# Patient Record
Sex: MALE | Race: WHITE | NOT HISPANIC OR LATINO | Employment: FULL TIME | URBAN - NONMETROPOLITAN AREA
[De-identification: names, ages, dates, MRNs, and addresses within clinical notes are randomized per-mention and may not be internally consistent; named-entity substitution may affect disease eponyms.]

---

## 2017-02-14 ENCOUNTER — TRANSFERRED RECORDS (OUTPATIENT)
Dept: HEALTH INFORMATION MANAGEMENT | Facility: HOSPITAL | Age: 42
End: 2017-02-14

## 2017-07-24 ENCOUNTER — OFFICE VISIT (OUTPATIENT)
Dept: FAMILY MEDICINE | Facility: OTHER | Age: 42
End: 2017-07-24
Attending: FAMILY MEDICINE
Payer: COMMERCIAL

## 2017-07-24 VITALS
OXYGEN SATURATION: 97 % | WEIGHT: 305 LBS | SYSTOLIC BLOOD PRESSURE: 124 MMHG | TEMPERATURE: 98.5 F | BODY MASS INDEX: 39.16 KG/M2 | HEART RATE: 76 BPM | DIASTOLIC BLOOD PRESSURE: 88 MMHG

## 2017-07-24 DIAGNOSIS — M54.42 ACUTE BILATERAL LOW BACK PAIN WITH LEFT-SIDED SCIATICA: Primary | ICD-10-CM

## 2017-07-24 PROCEDURE — 99213 OFFICE O/P EST LOW 20 MIN: CPT | Performed by: FAMILY MEDICINE

## 2017-07-24 ASSESSMENT — PATIENT HEALTH QUESTIONNAIRE - PHQ9: 5. POOR APPETITE OR OVEREATING: NOT AT ALL

## 2017-07-24 ASSESSMENT — PAIN SCALES - GENERAL: PAINLEVEL: NO PAIN (0)

## 2017-07-24 ASSESSMENT — ANXIETY QUESTIONNAIRES
GAD7 TOTAL SCORE: 0
6. BECOMING EASILY ANNOYED OR IRRITABLE: NOT AT ALL
1. FEELING NERVOUS, ANXIOUS, OR ON EDGE: NOT AT ALL
3. WORRYING TOO MUCH ABOUT DIFFERENT THINGS: NOT AT ALL
5. BEING SO RESTLESS THAT IT IS HARD TO SIT STILL: NOT AT ALL
2. NOT BEING ABLE TO STOP OR CONTROL WORRYING: NOT AT ALL
7. FEELING AFRAID AS IF SOMETHING AWFUL MIGHT HAPPEN: NOT AT ALL
IF YOU CHECKED OFF ANY PROBLEMS ON THIS QUESTIONNAIRE, HOW DIFFICULT HAVE THESE PROBLEMS MADE IT FOR YOU TO DO YOUR WORK, TAKE CARE OF THINGS AT HOME, OR GET ALONG WITH OTHER PEOPLE: NOT DIFFICULT AT ALL

## 2017-07-24 NOTE — MR AVS SNAPSHOT
"              After Visit Summary   7/24/2017    Pan Marroquin    MRN: [id number]           Patient Information     Date Of Birth          1975        Visit Information        Provider Department      7/24/2017 9:20 AM Jeronimo Hall MD [facility]        Today's Diagnoses     Acute bilateral low back pain with left-sided sciatica    -  1      Care Instructions    Take medrol dosepak as directed          Follow-ups after your visit        Additional Services     PHYSICAL THERAPY REFERRAL       *This therapy referral will be filtered to a centralized scheduling office at [facility] and the patient will receive a call to schedule an appointment at a [place] location most convenient for them. *     [facility] provides Physical Therapy evaluation and treatment and many specialty services across the [place] system.  If requesting a specialty program, please choose from the list below.    If you have not heard from the scheduling office within 2 business days, please call [phone] for all locations, with the exception of [place], please call [phone].  Treatment: Evaluation & Treatment  Special Instructions/Modalities: None  Special Programs: None    Please be aware that coverage of these services is subject to the terms and limitations of your health insurance plan.  Call member services at your health plan with any benefit or coverage questions.      **Note to Provider:  If you are referring outside of [place] for the therapy appointment, please list the name of the location in the \"special instructions\" above, print the referral and give to the patient to schedule the appointment.                  Follow-up notes from your care team     Return in about 1 month (around 8/24/2017) for Occupational follow up visit.      Who to contact     If you have questions or need follow up information about today's clinic visit or your schedule please contact " "[facility] [place] directly at [phone].  Normal or non-critical lab and imaging results will be communicated to you by MyChart, letter or phone within 4 business days after the clinic has received the results. If you do not hear from us within 7 days, please contact the clinic through [employer] or phone. If you have a critical or abnormal lab result, we will notify you by phone as soon as possible.  Submit refill requests through [employer] or call your pharmacy and they will forward the refill request to us. Please allow 3 business days for your refill to be completed.          Additional Information About Your Visit        [email or web address] Information     [employer] lets you send messages to your doctor, view your test results, renew your prescriptions, schedule appointments and more. To sign up, go to [email or web address] . Click on \"Log in\" on the left side of the screen, which will take you to the Welcome page. Then click on \"Sign up Now\" on the right side of the page.     You will be asked to enter the access code listed below, as well as some personal information. Please follow the directions to create your username and password.     Your access code is: [email or web address]  Expires: 10/25/2017 12:10 PM     Your access code will  in 90 days. If you need help or a new code, please call your [facility] or [phone].        Care EveryWhere ID     This is your Care EveryWhere ID. This could be used by other organizations to access your [facility] medical records  [id number]        Your Vitals Were     Pulse Temperature Pulse Oximetry BMI (Body Mass Index)          76 98.5  F (36.9  C) (Tympanic) 97% 39.16 kg/m2         Blood Pressure from Last 3 Encounters:   17 124/88   16 140/84   16 124/80    Weight from Last 3 Encounters:   17 (!) 305 lb (138.3 kg)   16 (!) 305 lb (138.3 kg)   16 (!) 313 lb (142 kg)              We Performed the Following     PHYSICAL THERAPY REFERRAL  "         Today's Medication Changes          These changes are accurate as of: 7/24/17 11:59 PM.  If you have any questions, ask your nurse or doctor.               Start taking these medicines.        Dose/Directions    methylPREDNISolone 4 MG tablet   Commonly known as:  MEDROL DOSEPAK   Used for:  Acute bilateral low back pain with left-sided sciatica   Started by:  Jeronimo Hall MD        Follow package instructions   Quantity:  21 tablet   Refills:  0            Where to get your medicines      These medications were sent to [facility] [place] - [address]  [address] [place] [address]     Phone:  [phone]     methylPREDNISolone 4 MG tablet                Primary Care Provider Office Phone # Fax #    Jeronimo Hall [phone]       [facility] [address]  [place] [address]        Equal Access to Services     CORDELIA CHAN : Hadii anil loredo hadasho Soomaali, waaxda luqadaha, qaybta kaalmada adeegyada, crista stoll . So [facility] [phone].    ATENCIÓN: Si habla español, tiene a golden disposición servicios gratuitos de asistencia lingüística. [facility] [phone].    We comply with applicable federal civil rights laws and [place] laws. We do not discriminate on the basis of race, color, national origin, age, disability sex, sexual orientation or gender identity.            Thank you!     Thank you for choosing [facility]  for your care. Our goal is always to provide you with excellent care. Hearing back from our patients is one way we can continue to improve our services. Please take a few minutes to complete the written survey that you may receive in the mail after your visit with us. Thank you!             Your Updated Medication List - Protect others around you: Learn how to safely use, store and throw away your medicines at www.disposemymeds.org.          This list is accurate as of: 7/24/17 11:59 PM.   Always use your most recent med list.                   Brand Name Dispense Instructions for use Diagnosis    methylPREDNISolone 4 MG tablet    MEDROL DOSEPAK    21 tablet    Follow package instructions    Acute bilateral low back pain with left-sided sciatica       mometasone 0.1 % cream    ELOCON    45 g    Apply sparingly to affected area twice daily as needed.  Do not apply to face.    Intrinsic eczema       nexIUM 40 MG CR capsule   Generic drug:  esomeprazole     90 capsule    Take 1 capsule (40 mg) by mouth daily Take 30-60 minutes before eating.    Gastroesophageal reflux disease with esophagitis

## 2017-07-24 NOTE — NURSING NOTE
"Chief Complaint   Patient presents with     Back Pain     Sciatica       Initial /88 (BP Location: Left arm, Patient Position: Chair, Cuff Size: Adult Large)  Pulse 76  Temp 98.5  F (36.9  C) (Tympanic)  Wt (!) 305 lb (138.3 kg)  SpO2 97%  BMI 39.16 kg/m2 Estimated body mass index is 39.16 kg/(m^2) as calculated from the following:    Height as of 12/12/16: 6' 2\" (1.88 m).    Weight as of this encounter: 305 lb (138.3 kg).  Medication Reconciliation: complete   Ariadne Gusman    "

## 2017-07-25 ASSESSMENT — PATIENT HEALTH QUESTIONNAIRE - PHQ9: SUM OF ALL RESPONSES TO PHQ QUESTIONS 1-9: 0

## 2017-07-25 ASSESSMENT — ANXIETY QUESTIONNAIRES: GAD7 TOTAL SCORE: 0

## 2017-07-27 RX ORDER — METHYLPREDNISOLONE 4 MG
TABLET, DOSE PACK ORAL
Qty: 21 TABLET | Refills: 0 | Status: SHIPPED | OUTPATIENT
Start: 2017-07-27 | End: 2019-01-07

## 2017-07-27 NOTE — PROGRESS NOTES
OCCUPATIONAL VISIT    SUBJECTIVE:  Pan Marroquin, 41 year old, male is seen with left low back and leg pain.  This occurred while getting into a truck at [employer].  The date of injury is 11/9/2014.  The patient is employed at [employer]. His symptoms have recurred.    Denies bowel dysfunction, bladder dysfunction, saddle anesthesia, nocturnal symptoms, focal weakness,  or trauma.         No Known Allergies      Review Of Systems  Constitutional, HEENT, cardiovascular, pulmonary, gi and gu systems are negative, except as otherwise noted.    OBJECTIVE:  Vitals: B/P: 150/96, T: Data Unavailable, P: 64, R: 16    Exam:  GENERAL:: healthy, alert and no distress  BACK: no CVA tenderness, left paralumbar tenderness. Reflexes preserved  PSYCH: Alert and oriented times 3; speech- coherent , normal rate and volume; able to articulate logical thoughts, able to abstract reason, no tangential thoughts, no hallucinations or delusions, affect- normal  Other exam not repeated    Labs:  None      ASSESSMENT/PLAN:  Left sided sciatica  Reviewed.  Medrol dosepak as written.  Recommend Physical Therapy.  Follow up 1 month.            Jeronimo Hall MD

## 2017-10-02 DIAGNOSIS — L20.84 INTRINSIC ECZEMA: ICD-10-CM

## 2017-10-03 RX ORDER — MOMETASONE FUROATE 1 MG/G
CREAM TOPICAL
Qty: 45 G | Refills: 1 | Status: SHIPPED | OUTPATIENT
Start: 2017-10-03 | End: 2019-01-07

## 2017-10-03 NOTE — TELEPHONE ENCOUNTER
mometasone (ELOCON) 0.1 % cream      Last Written Prescription Date: 6/6/16  Last Fill Quantity: 45g,  # refills: 0   Last Office Visit with [facility], [facility] or [facility] prescribing provider: 7/24/17

## 2018-06-05 DIAGNOSIS — K21.00 GASTROESOPHAGEAL REFLUX DISEASE WITH ESOPHAGITIS: ICD-10-CM

## 2018-06-06 RX ORDER — ESOMEPRAZOLE MAGNESIUM 40 MG
CAPSULE,DELAYED RELEASE (ENTERIC COATED) ORAL
Qty: 90 CAPSULE | Refills: 0 | Status: SHIPPED | OUTPATIENT
Start: 2018-06-06 | End: 2018-09-02

## 2018-06-06 NOTE — TELEPHONE ENCOUNTER
Nexium  Last Office Visit: 7/24/17  Last Refill Date:12/8/17  # 90          Refills  1      Thank you!

## 2018-12-04 DIAGNOSIS — K21.00 GASTROESOPHAGEAL REFLUX DISEASE WITH ESOPHAGITIS: ICD-10-CM

## 2018-12-04 NOTE — LETTER
December 6, 2018      Pan Marroquin  [address] [place] [address]        Dear Pan,     APPOINTMENT REMINDER:   Our records indicates that it is time for you to be seen for a follow up appointment.      Your current medication request for NEXIUM 40 MG CR capsule will be approved for one refill but you will need to be seen before any additional refills can be approved.  Taking care of your health is important to us, and ongoing visits with your provider are vital to your care.    We look forward to seeing you in the near future.  You may call our office at [phone] to schedule a visit.     Please disregard this notice if you have already made an appointment.        Sincerely,    Jeronimo Hall MD

## 2018-12-05 NOTE — TELEPHONE ENCOUNTER
NEXIUM 40 MG DR capsule    Last Written Prescription Date:  9/5/18  Last Fill Quantity: 90,   # refills: 0  Last Office Visit: 7/24/17  Future Office visit:

## 2018-12-06 RX ORDER — ESOMEPRAZOLE MAGNESIUM 40 MG
CAPSULE,DELAYED RELEASE (ENTERIC COATED) ORAL
Qty: 30 CAPSULE | Refills: 0 | Status: SHIPPED | OUTPATIENT
Start: 2018-12-06 | End: 2019-01-07

## 2019-01-07 ENCOUNTER — OFFICE VISIT (OUTPATIENT)
Dept: FAMILY MEDICINE | Facility: OTHER | Age: 44
End: 2019-01-07
Attending: FAMILY MEDICINE
Payer: COMMERCIAL

## 2019-01-07 VITALS
BODY MASS INDEX: 39.16 KG/M2 | DIASTOLIC BLOOD PRESSURE: 106 MMHG | TEMPERATURE: 98 F | RESPIRATION RATE: 20 BRPM | SYSTOLIC BLOOD PRESSURE: 140 MMHG | OXYGEN SATURATION: 92 % | HEART RATE: 80 BPM | HEIGHT: 74 IN

## 2019-01-07 DIAGNOSIS — R03.0 ELEVATED BLOOD PRESSURE READING WITHOUT DIAGNOSIS OF HYPERTENSION: ICD-10-CM

## 2019-01-07 DIAGNOSIS — K21.00 GASTROESOPHAGEAL REFLUX DISEASE WITH ESOPHAGITIS: ICD-10-CM

## 2019-01-07 DIAGNOSIS — L20.84 INTRINSIC ECZEMA: ICD-10-CM

## 2019-01-07 DIAGNOSIS — K21.00 GASTROESOPHAGEAL REFLUX DISEASE WITH ESOPHAGITIS: Primary | ICD-10-CM

## 2019-01-07 PROBLEM — E66.01 MORBID OBESITY (H): Status: ACTIVE | Noted: 2019-01-07

## 2019-01-07 PROCEDURE — 99213 OFFICE O/P EST LOW 20 MIN: CPT | Performed by: FAMILY MEDICINE

## 2019-01-07 RX ORDER — ESOMEPRAZOLE MAGNESIUM 40 MG
40 CAPSULE,DELAYED RELEASE (ENTERIC COATED) ORAL
Qty: 90 CAPSULE | Refills: 1 | Status: SHIPPED | OUTPATIENT
Start: 2019-01-07 | End: 2019-05-10

## 2019-01-07 RX ORDER — MOMETASONE FUROATE 1 MG/G
CREAM TOPICAL
Qty: 45 G | Refills: 1 | Status: SHIPPED | OUTPATIENT
Start: 2019-01-07 | End: 2021-04-21

## 2019-01-07 ASSESSMENT — PATIENT HEALTH QUESTIONNAIRE - PHQ9: SUM OF ALL RESPONSES TO PHQ QUESTIONS 1-9: 2

## 2019-01-07 ASSESSMENT — PAIN SCALES - GENERAL: PAINLEVEL: NO PAIN (0)

## 2019-01-07 NOTE — NURSING NOTE
"Chief Complaint   Patient presents with     Gastrophageal Reflux       Initial BP (!) 140/108 (BP Location: Left arm, Patient Position: Sitting, Cuff Size: Adult Large)   Pulse 80   Temp 98  F (36.7  C) (Tympanic)   Resp 20   Ht 1.88 m (6' 2\")   SpO2 92%   BMI 39.16 kg/m   Estimated body mass index is 39.16 kg/m  as calculated from the following:    Height as of this encounter: 1.88 m (6' 2\").    Weight as of 7/24/17: 138.3 kg (305 lb).  Medication Reconciliation: complete    Sonam Henriquez LPN  "

## 2019-01-07 NOTE — PROGRESS NOTES
SUBJECTIVE:   Pan Marroquin is a 43 year old male who presents to clinic today for the following health issues:    Gastrointestinal symptoms      Duration: prior     Description:           REFLUX SYMPTOMS - heartburn and acid taste in mouth      Intensity:  mild    Accompanying signs and symptoms:  none    History  Previous {similar problem: YES  Previous evaluation:  EGD    Aggravating factors: none, fatty meals, alcohol and spicy foods    Alleviating factors: nexium    Other Therapies tried: None         Amount of exercise or physical activity: 2-3 days/week for an average of 30-45 minutes    Problems taking medications regularly: No    Medication side effects: none    Diet: regular (no restrictions)        Elevated blood pressure      Duration: Recent    Description (location/character/radiation): NA    Intensity:  mild    Accompanying signs and symptoms: None    History (similar episodes/previous evaluation): None    Precipitating or alleviating factors: anxiety    Therapies tried and outcome: None      Pan has had mildly elevated blood pressures.  This has been check at his place of employment and has been normal to mildly elevated diastolic.  He has a family history of hypertension.    Problem list and histories reviewed & adjusted, as indicated.  Additional history: as documented    Patient Active Problem List   Diagnosis     GERD (gastroesophageal reflux disease)     Obesity     RUSSELL (obstructive sleep apnea)     ACP (advance care planning)     Eczema     Obesity (BMI 35.0-39.9) with comorbidity (H)     Past Surgical History:   Procedure Laterality Date     EGD  2005     shoulder  2007    left       Social History     Tobacco Use     Smoking status: Never Smoker     Smokeless tobacco: Never Used   Substance Use Topics     Alcohol use: Yes     Comment: rarely     Family History   Problem Relation Age of Onset     Cancer Unknown         unknown; family h/o     Diabetes Unknown         family h/o     Other -  "See Comments Father         GERD     Thyroid Disease No family hx of      Asthma No family hx of          Current Outpatient Medications   Medication Sig Dispense Refill     mometasone (ELOCON) 0.1 % cream APPLY SPARINGLY TO AFFECTED AREA TWICE A DAY AS NEEDED. DO NOT APPLY TO FACE 45 g 1     NEXIUM 40 MG DR capsule TAKE 1 CAPSULE DAILY 30 TO 60 MINUTES BEFORE EATING (DUE FOR OFFICE VISIT) 30 capsule 0     No Known Allergies  BP Readings from Last 3 Encounters:   01/07/19 (!) 140/106   07/24/17 124/88   12/12/16 140/84    Wt Readings from Last 3 Encounters:   07/24/17 (!) 138.3 kg (305 lb)   12/12/16 (!) 138.3 kg (305 lb)   06/06/16 (!) 142 kg (313 lb)                    Reviewed and updated as needed this visit by clinical staff  Tobacco  Allergies  Meds  Problems  Med Hx  Surg Hx  Fam Hx       Reviewed and updated as needed this visit by Provider         ROS:  Constitutional, HEENT, cardiovascular, pulmonary, gi and gu systems are negative, except as otherwise noted.    OBJECTIVE:     BP (!) 140/106 (BP Location: Left arm, Patient Position: Sitting, Cuff Size: Adult Large)   Pulse 80   Temp 98  F (36.7  C) (Tympanic)   Resp 20   Ht 1.88 m (6' 2\")   SpO2 92%   BMI 39.16 kg/m    Body mass index is 39.16 kg/m .  Physical Exam   Constitutional: He is oriented to person, place, and time. He appears well-developed and well-nourished. No distress.   Neurological: He is alert and oriented to person, place, and time.   Psychiatric: He has a normal mood and affect.       Other exam not repeated.  Diagnostic Test Results:  none     ASSESSMENT/PLAN:     GERD (gastroesophageal reflux disease)  He has had previous EGD.  Continue Nexium as written.  - NEXIUM 40 MG DR capsule; Take 1 capsule (40 mg) by mouth every morning (before breakfast) Take 30-60 minutes before eating.    Elevated blood pressure reading without diagnosis of hypertension  Instructed in home blood pressure monitoring.  If remains elevated, will " place on angiotensin coverting enzyme inhibitor     Eczema  Refilled  - mometasone (ELOCON) 0.1 % external cream; APPLY SPARINGLY TO AFFECTED AREA TWICE A DAY AS NEEDED. DO NOT APPLY TO FACE            Jeronimo Hall MD  [facility] [place]

## 2019-05-09 DIAGNOSIS — K21.00 GASTROESOPHAGEAL REFLUX DISEASE WITH ESOPHAGITIS: ICD-10-CM

## 2019-05-09 NOTE — TELEPHONE ENCOUNTER
Nexium 40 MG DR Capsule   Last Written Prescription Date:1/7/2018  Last Fill Quantity: 90,   # refills: 1  Last Office Visit: 1/7/2019  Future Office visit:       Routing refill request to provider for review/approval because:    [facility].

## 2019-05-10 RX ORDER — ESOMEPRAZOLE MAGNESIUM 40 MG
40 CAPSULE,DELAYED RELEASE (ENTERIC COATED) ORAL
Qty: 90 CAPSULE | Refills: 1 | Status: SHIPPED | OUTPATIENT
Start: 2019-05-10 | End: 2019-08-14

## 2019-08-12 DIAGNOSIS — K21.00 GASTROESOPHAGEAL REFLUX DISEASE WITH ESOPHAGITIS: ICD-10-CM

## 2019-08-12 NOTE — TELEPHONE ENCOUNTER
Nexium      Last Written Prescription Date:  5/09/2019  Last Fill Quantity: 90,   # refills: 1  Last Office Visit: 1/07/2019  Future Office visit:

## 2019-08-14 RX ORDER — ESOMEPRAZOLE MAGNESIUM 40 MG
40 CAPSULE,DELAYED RELEASE (ENTERIC COATED) ORAL
Qty: 90 CAPSULE | Refills: 0 | Status: SHIPPED | OUTPATIENT
Start: 2019-08-14 | End: 2019-12-31

## 2019-12-29 DIAGNOSIS — K21.00 GASTROESOPHAGEAL REFLUX DISEASE WITH ESOPHAGITIS: ICD-10-CM

## 2019-12-31 RX ORDER — ESOMEPRAZOLE MAGNESIUM 40 MG
CAPSULE,DELAYED RELEASE (ENTERIC COATED) ORAL
Qty: 90 CAPSULE | Refills: 0 | Status: SHIPPED | OUTPATIENT
Start: 2019-12-31 | End: 2020-03-30

## 2020-03-29 DIAGNOSIS — K21.00 GASTROESOPHAGEAL REFLUX DISEASE WITH ESOPHAGITIS: ICD-10-CM

## 2020-03-30 RX ORDER — ESOMEPRAZOLE MAGNESIUM 40 MG
CAPSULE,DELAYED RELEASE (ENTERIC COATED) ORAL
Qty: 90 CAPSULE | Refills: 3 | Status: SHIPPED | OUTPATIENT
Start: 2020-03-30 | End: 2021-03-25

## 2020-03-30 NOTE — TELEPHONE ENCOUNTER
Nexium  Last Written Prescription Date: 12/31/19  Last Fill Quantity: 90 # of Refills: 0  Last Office Visit: 1/7/19

## 2021-04-12 NOTE — PROGRESS NOTES
"    Assessment & Plan     Gastroesophageal reflux disease with esophagitis, unspecified whether hemorrhage  Stable.  Refilled as written.  Follow up one year  - NEXIUM 40 MG DR capsule; TAKE 1 CAPSULE EVERY MORNING BEFORE BREAKFAST 30 TO 60 MINUTES BEFORE EATING    Eczema  Refilled as written  - mometasone (ELOCON) 0.1 % external cream; APPLY SPARINGLY TO AFFECTED AREA TWICE A DAY AS NEEDED. DO NOT APPLY TO FACE      BMI:   Estimated body mass index is 44.3 kg/m  as calculated from the following:    Height as of this encounter: 1.88 m (6' 2\").    Weight as of this encounter: 156.5 kg (345 lb).   Weight management plan: Discussed healthy diet and exercise guidelines    See Patient Instructions    No follow-ups on file.    Jeronimo Hall MD  [facility] [place] Perla is a 45 year old who presents for the following health issues     HPI     GERD/Heartburn  Onset/Duration: years  Description: gerd  Intensity: mild  Progression of Symptoms: improving  Accompanying Signs & Symptoms:  Does it feel like food gets stuck or trouble swallowing: no  Nausea: no  Vomiting (bloody?): no  Abdominal Pain: no  Black-Tarry stools: no  Bloody stools: no  History:  Previous similar episodes: YES  Previous ulcers: no  Precipitating factors:   Caffeine use: YES  Alcohol use: YES- occasionally  NSAID/Aspirin use: no  Tobacco use: no  Worse with no particular food or drink.  Alleviating factors: medication  Therapies tried and outcome:             Lifestyle changes: None            Medications: Mitra Perla is seen in follow up of GERD (gastroesophageal reflux disease).  He has remained on Nexium with  Improvement of symptoms.  His previous endoscopy is reviewed.    Review of Systems   Constitutional, HEENT, cardiovascular, pulmonary, gi and gu systems are negative, except as otherwise noted.      Objective    /86 (BP Location: Right arm, Patient Position: Sitting, Cuff Size: Adult Large)   Pulse 79  " " Temp 98.2  F (36.8  C) (Tympanic)   Resp 20   Ht 1.88 m (6' 2\")   Wt (!) 156.5 kg (345 lb)   SpO2 97%   BMI 44.30 kg/m    Body mass index is 44.3 kg/m .  Physical Exam  Vitals signs and nursing note reviewed.   Constitutional:       Appearance: He is well-developed.   Neurological:      Mental Status: He is alert and oriented to person, place, and time.   Psychiatric:         Mood and Affect: Mood normal.         Behavior: Behavior normal.         Thought Content: Thought content normal.        Other exam not repeated    No results found for any previous visit.             "

## 2021-04-21 ENCOUNTER — OFFICE VISIT (OUTPATIENT)
Dept: FAMILY MEDICINE | Facility: OTHER | Age: 46
End: 2021-04-21
Attending: FAMILY MEDICINE
Payer: COMMERCIAL

## 2021-04-21 VITALS
DIASTOLIC BLOOD PRESSURE: 86 MMHG | TEMPERATURE: 98.2 F | OXYGEN SATURATION: 97 % | SYSTOLIC BLOOD PRESSURE: 138 MMHG | HEART RATE: 79 BPM | BODY MASS INDEX: 40.43 KG/M2 | RESPIRATION RATE: 20 BRPM | HEIGHT: 74 IN | WEIGHT: 315 LBS

## 2021-04-21 DIAGNOSIS — L20.84 INTRINSIC ECZEMA: ICD-10-CM

## 2021-04-21 DIAGNOSIS — K21.00 GASTROESOPHAGEAL REFLUX DISEASE WITH ESOPHAGITIS, UNSPECIFIED WHETHER HEMORRHAGE: ICD-10-CM

## 2021-04-21 PROCEDURE — 99213 OFFICE O/P EST LOW 20 MIN: CPT | Performed by: FAMILY MEDICINE

## 2021-04-21 RX ORDER — ESOMEPRAZOLE MAGNESIUM 40 MG
CAPSULE,DELAYED RELEASE (ENTERIC COATED) ORAL
Qty: 90 CAPSULE | Refills: 1 | Status: SHIPPED | OUTPATIENT
Start: 2021-04-21 | End: 2021-05-13

## 2021-04-21 RX ORDER — MOMETASONE FUROATE 1 MG/G
CREAM TOPICAL
Qty: 45 G | Refills: 1 | Status: SHIPPED | OUTPATIENT
Start: 2021-04-21 | End: 2022-04-14

## 2021-04-21 ASSESSMENT — PATIENT HEALTH QUESTIONNAIRE - PHQ9: SUM OF ALL RESPONSES TO PHQ QUESTIONS 1-9: 0

## 2021-04-21 ASSESSMENT — MIFFLIN-ST. JEOR: SCORE: 2519.66

## 2021-04-21 ASSESSMENT — PAIN SCALES - GENERAL: PAINLEVEL: NO PAIN (0)

## 2021-04-21 NOTE — NURSING NOTE
"No chief complaint on file.      Initial /86 (BP Location: Right arm, Patient Position: Sitting, Cuff Size: Adult Large)   Pulse 79   Temp 98.2  F (36.8  C) (Tympanic)   Resp 20   Ht 1.88 m (6' 2\")   Wt (!) 156.5 kg (345 lb)   SpO2 97%   BMI 44.30 kg/m   Estimated body mass index is 44.3 kg/m  as calculated from the following:    Height as of this encounter: 1.88 m (6' 2\").    Weight as of this encounter: 156.5 kg (345 lb).  Medication Reconciliation: complete  Sonam Henriquez LPN    "

## 2021-05-13 ENCOUNTER — TELEPHONE (OUTPATIENT)
Dept: FAMILY MEDICINE | Facility: OTHER | Age: 46
End: 2021-05-13

## 2021-05-13 DIAGNOSIS — K21.00 GASTROESOPHAGEAL REFLUX DISEASE WITH ESOPHAGITIS, UNSPECIFIED WHETHER HEMORRHAGE: ICD-10-CM

## 2021-05-13 RX ORDER — ESOMEPRAZOLE MAGNESIUM 40 MG
CAPSULE,DELAYED RELEASE (ENTERIC COATED) ORAL
Qty: 90 CAPSULE | Refills: 1 | Status: SHIPPED | OUTPATIENT
Start: 2021-05-13 | End: 2021-07-07

## 2021-06-03 NOTE — TELEPHONE ENCOUNTER
Thank you for the information. I will look for the incoming fax and update with a Telephone Encounter for progress on the PA.

## 2021-06-03 NOTE — TELEPHONE ENCOUNTER
"2:18 PM  Pt reports fax is coming in today regarding PA for nexium. Pt reports he has \"tried generic for Nexium twice and it doesn't work\"  "

## 2021-06-04 NOTE — TELEPHONE ENCOUNTER
Received PA Request Form from [employer] for Nexium. Noted that the patient has tried/failed generic Nexium twice and that a Cost Selection Override is being requested. Filled out the form and submitted supporting documentation. Waiting for a response.

## 2021-06-07 NOTE — TELEPHONE ENCOUNTER
Received an APPROVAL from [employer] for Nexium 40mg capsule. Effective 5/17/2021 - 6/7/2022. Forms scanned to Epic.

## 2021-06-28 ENCOUNTER — VIRTUAL VISIT (OUTPATIENT)
Dept: FAMILY MEDICINE | Facility: OTHER | Age: 46
End: 2021-06-28
Attending: FAMILY MEDICINE
Payer: COMMERCIAL

## 2021-06-28 DIAGNOSIS — G89.29 CHRONIC BILATERAL LOW BACK PAIN WITH LEFT-SIDED SCIATICA: Primary | ICD-10-CM

## 2021-06-28 DIAGNOSIS — M54.42 CHRONIC BILATERAL LOW BACK PAIN WITH LEFT-SIDED SCIATICA: Primary | ICD-10-CM

## 2021-06-28 PROCEDURE — 99213 OFFICE O/P EST LOW 20 MIN: CPT | Mod: 95 | Performed by: FAMILY MEDICINE

## 2021-06-28 NOTE — NURSING NOTE
"Chief Complaint   Patient presents with     Work Comp       Initial There were no vitals taken for this visit. Estimated body mass index is 44.3 kg/m  as calculated from the following:    Height as of 4/21/21: 1.88 m (6' 2\").    Weight as of 4/21/21: 156.5 kg (345 lb).  Medication Reconciliation: complete  Sonam Henriquez LPN  "

## 2021-07-02 ENCOUNTER — TELEPHONE (OUTPATIENT)
Dept: FAMILY MEDICINE | Facility: OTHER | Age: 46
End: 2021-07-02
Payer: COMMERCIAL

## 2021-07-02 NOTE — TELEPHONE ENCOUNTER
Called and updated pt that paperwork is still in process - pt requested a phone call when paperwork is ready for

## 2021-07-02 NOTE — TELEPHONE ENCOUNTER
Call from patient relating to appt on 6/28/21. Patient is awaiting a letter/note on restrictions. Patient unsure if he is to  the letter/note or if it is being mailed.     Please advise.     Patient can be reached at [phone]

## 2021-07-02 NOTE — LETTER
November 15, 2021      Pan Marroquin  [address] [place] [address]        To Whom It May Concern:    Pan Marroquin was seen in our clinic. 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      Sincerely,        Jeronimo Hall MD

## 2021-07-07 DIAGNOSIS — K21.00 GASTROESOPHAGEAL REFLUX DISEASE WITH ESOPHAGITIS, UNSPECIFIED WHETHER HEMORRHAGE: ICD-10-CM

## 2021-07-07 RX ORDER — ESOMEPRAZOLE MAGNESIUM 40 MG
CAPSULE,DELAYED RELEASE (ENTERIC COATED) ORAL
Qty: 30 CAPSULE | Refills: 1 | Status: SHIPPED | OUTPATIENT
Start: 2021-07-07 | End: 2021-12-17

## 2021-07-11 NOTE — PROGRESS NOTES
"    Assessment & Plan     Chronic bilateral low back pain with left-sided sciatica  Discussed ongoing management.  Reviewed ergonomics and chronic low back pain.  Recommendations written.  Follow up one month.        BMI:   Estimated body mass index is 44.3 kg/m  as calculated from the following:    Height as of 4/21/21: 1.88 m (6' 2\").    Weight as of 4/21/21: 156.5 kg (345 lb).   Weight management plan: Discussed healthy diet and exercise guidelines    See Patient Instructions    No follow-ups on file.    Jeronimo Hall MD  [facility] [place]    Srinath Perla is a 45 year old who presents for the following health issues     HPI   OCCUPATIONAL  Pan has a history of chronic and recurrent low back pain with left sciatica.  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  The ergonomics in this particular model result in him spending a fair amount of time leaning forward as opposed to the other models   No new traumas    Objective    There were no vitals taken for this visit.  There is no height or weight on file to calculate BMI.  Physical Exam  Vitals signs and nursing note reviewed.   Constitutional:       Appearance: He is well-developed.   Neurological:      Mental Status: He is alert and oriented to person, place, and time.   Psychiatric:         Mood and Affect: Mood normal.         Behavior: Behavior normal.         Thought Content: Thought content normal.        Other exam not repeated    No results found for any previous visit.     Time of encounter:  13 minutes        "

## 2021-08-04 ENCOUNTER — TELEPHONE (OUTPATIENT)
Dept: FAMILY MEDICINE | Facility: OTHER | Age: 46
End: 2021-08-04

## 2021-08-04 NOTE — TELEPHONE ENCOUNTER
Patient states that he has been required to wear face coverings at work and it is making him very very sick. States it is creating bronchial symptoms and affecting his breathing  Would like to speak with provider about it.  He also need's other form's that provider was suppose to get to him about ergonomics at work that is creating back problem's.    [phone]

## 2021-08-05 NOTE — TELEPHONE ENCOUNTER
Pt called back regarding below. Spoke with manager Hallie regarding pt request for exemption to wearing mask. Per management no mask exemption letters. Pt informed and verbalizes understanding.

## 2021-08-05 NOTE — TELEPHONE ENCOUNTER
Call returned again from patient, reporting he has not received a call back in regards to the face coverings at work.     Patient states if not vaccinated for covid 19, staff are required to wear face coverings.    Patient also awaiting on a letter for back pain-ergonomics from work.     Patient requesting a return call to discuss.     Patient can be reached at [phone]

## 2021-11-15 ENCOUNTER — E-VISIT (OUTPATIENT)
Dept: FAMILY MEDICINE | Facility: OTHER | Age: 46
End: 2021-11-15
Attending: FAMILY MEDICINE
Payer: COMMERCIAL

## 2021-11-15 DIAGNOSIS — Z91.199 FAILURE TO ATTEND APPOINTMENT: Primary | ICD-10-CM

## 2021-11-15 PROCEDURE — 10000001 PR ERRONEOUS ENCOUNTER--DISREGARD: Performed by: FAMILY MEDICINE

## 2021-11-15 NOTE — TELEPHONE ENCOUNTER
"11/15/2021  8:35 AM  Pt called again regarding work restrictions letter. Pt stated \"I don't think this letter was completed\".    Pt reports he works at [employer].   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     Pt requesting call back when letter is ready for  and please fax letter to [phone].    It appears PCP returns to office on Wednesday. Letter placed in PCP's bin to sign/advise on plan. Pt updated.     "

## 2021-11-19 NOTE — TELEPHONE ENCOUNTER
Jeronimo Hall MD  You 3 hours ago (5:10 AM)     KARY Kelly,   I rewrote the letter and it is now in his chart.  If you could sign my name and put your initials by it that would be great.  Can you fax this to the dispensary?  Fax is [phone]     Thank you!     PARVEEN    11/19/2021  9:21 AM  Per management writer unable to sign. Letter placed with Dr. Ochoa as writer was instructed to do so.

## 2021-11-22 NOTE — TELEPHONE ENCOUNTER
Please contact patient and make sure that this issue was resolved. The letter was provided. If having more issues with back, needs in person appt. Please cancel this visit.     Johnna Ochoa MD

## 2021-11-28 ENCOUNTER — HEALTH MAINTENANCE LETTER (OUTPATIENT)
Age: 46
End: 2021-11-28

## 2021-12-16 DIAGNOSIS — K21.00 GASTROESOPHAGEAL REFLUX DISEASE WITH ESOPHAGITIS, UNSPECIFIED WHETHER HEMORRHAGE: ICD-10-CM

## 2021-12-17 RX ORDER — ESOMEPRAZOLE MAGNESIUM 40 MG
CAPSULE,DELAYED RELEASE (ENTERIC COATED) ORAL
Qty: 90 CAPSULE | Refills: 1 | Status: SHIPPED | OUTPATIENT
Start: 2021-12-17 | End: 2022-06-14

## 2022-01-24 ENCOUNTER — OFFICE VISIT (OUTPATIENT)
Dept: FAMILY MEDICINE | Facility: OTHER | Age: 47
End: 2022-01-24
Payer: COMMERCIAL

## 2022-01-24 DIAGNOSIS — Z20.822 SUSPECTED COVID-19 VIRUS INFECTION: ICD-10-CM

## 2022-01-24 PROCEDURE — U0005 INFEC AGEN DETEC AMPLI PROBE: HCPCS | Mod: 90

## 2022-01-24 PROCEDURE — U0003 INFECTIOUS AGENT DETECTION BY NUCLEIC ACID (DNA OR RNA); SEVERE ACUTE RESPIRATORY SYNDROME CORONAVIRUS 2 (SARS-COV-2) (CORONAVIRUS DISEASE [COVID-19]), AMPLIFIED PROBE TECHNIQUE, MAKING USE OF HIGH THROUGHPUT TECHNOLOGIES AS DESCRIBED BY CMS-2020-01-R: HCPCS | Mod: 90

## 2022-01-25 LAB — SARS-COV-2 RNA RESP QL NAA+PROBE: NORMAL

## 2022-01-26 ENCOUNTER — TELEPHONE (OUTPATIENT)
Dept: NURSING | Facility: CLINIC | Age: 47
End: 2022-01-26

## 2022-01-26 LAB — SARS-COV-2 RNA RESP QL NAA+PROBE: DETECTED

## 2022-01-26 NOTE — TELEPHONE ENCOUNTER
Patient classified as COVID treatment eligible by Epic high risk algorithm:  Yes     Coronavirus (COVID-19) Notification    Reason for call  Notify of POSITIVE COVID-19 lab result, assess symptoms,  review [facility] recommendations    Lab Result   Lab test for 2019-nCoV rRt-PCR or SARS-COV-2 PCR  Oropharyngeal AND/OR nasopharyngeal swabs were POSITIVE for 2019-nCoV RNA [OR] SARS-COV-2 RNA (COVID-19) RNA     We have been unable to reach patient by phone at this time to notify of their Positive COVID-19 result.    Left voicemail message requesting a call back to [phone] [facility] for results.        A Positive COVID-19 letter will be sent via [facility] or the mail. (Exception, no letters sent to Presurgerical/Preprocedure Patients)    Chel Serna

## 2022-04-13 NOTE — PROGRESS NOTES
"  Assessment & Plan   Problem List Items Addressed This Visit        Musculoskeletal and Integumentary    Eczema    Relevant Medications    mometasone (ELOCON) 0.1 % external cream      Other Visit Diagnoses     Primary hypertension    -  Primary    Relevant Medications    metoprolol succinate ER (TOPROL-XL) 25 MG 24 hr tablet    Other Relevant Orders    Comprehensive metabolic panel (BMP + Alb, Alk Phos, ALT, AST, Total. Bili, TP)    CBC with platelets and differential    EKG 12-lead complete w/read - (Clinic Performed)           30 minutes spent on the date of the encounter doing chart review, review of test results, interpretation of tests, patient visit and documentation        BMI:   Estimated body mass index is 44.68 kg/m  as calculated from the following:    Height as of this encounter: 1.88 m (6' 2\").    Weight as of this encounter: 157.9 kg (348 lb).           Karel Gonzalez, [facility]    Srinath Perla is a 46 year old who presents for the following health issues     HPI     Pan presents today to establish care.  He has a history of GERD.  He also has noticed his BP has been a bit elevated.  He has reported weight gain of 120# since he started working as a  10 years ago.  Now he took a new more active job at work.     ESTABLISH CARE    Medication Followup of Elocon    Taking Medication as prescribed: yes    Side Effects:  None    Medication Helping Symptoms:  yes         Review of Systems   Constitutional, HEENT, cardiovascular, pulmonary, gi and gu systems are negative, except as otherwise noted.      Objective    BP (!) 150/100 (BP Location: Left arm, Patient Position: Chair, Cuff Size: Adult Large)   Pulse 72   Temp 98.1  F (36.7  C) (Tympanic)   Ht 1.88 m (6' 2\")   Wt (!) 157.9 kg (348 lb)   SpO2 95%   BMI 44.68 kg/m    Body mass index is 44.68 kg/m .  Physical Exam   GENERAL: alert and no distress  RESP: lungs clear to auscultation - " no rales, rhonchi or wheezes  CV: regular rate and rhythm, normal S1 S2, no S3 or S4, no murmur, click or rub, no peripheral edema and peripheral pulses strong  MS: no gross musculoskeletal defects noted, no edema  SKIN: no suspicious lesions or rashes  NEURO: Normal strength and tone, mentation intact and speech normal  PSYCH: mentation appears normal, affect normal/bright    Office Visit on 01/24/2022   Component Date Value Ref Range Status     SARS CoV2 PCR 01/24/2022 Testing sent to reference lab. Results will be returned via unsolicited result  Negative Final     COVID-19 Virus PCR - Result 01/24/2022 DETECTED (A)  Final    Comment: Detected  Abnormal Result    Positive for 2019-nCoV.    Patient sample was heat inactivated and amplified using the   HDPCR(TM) SARS-CoV-2 assay ([employer] The HDPCRTM   SARS-CoV-2 assay is a reverse transcription real-time   polymerase chain reaction (qRT-PCR) test intended for the   qualitative detection of nucleic acid from SARS-CoV-2 in   human nasopharyngeal swabs, oropharyngeal swabs, anterior   nasal swabs, mid-turbinate nasal swabs as well as nasal   aspirate, nasal wash, and bronchoalveolar lavage (BAL)   specimens from individuals who are suspected of COVID-19 by   their healthcare provider.    Positive results should also be reported in accordance with   local, state, and federal regulations.    Nasopharyngeal specimen is the preferred choice for   swab-based SARS CoV2 testing. When collection of a   nasopharyngeal swab is not possible the following are   acceptable alternatives:  an oropharyngeal (OP) specimen collected by a healthcare   professional, or nasal                            mid-turbinate (NMT) swab collected by   a healthcare professional or by onsite self-collection   (using a flocked tapered swab), or an anterior nares   specimen collected by a healthcare professional or by onsite   self-collection (using a round foam swab). ([facility]   [facility]  [facility])    Testing performed by [facility] at   the [facility] [address] [facility]   [address].    The test performance characteristics were determined by   [facility]. It has not been cleared or approved by the FDA.    The laboratory is regulated under the Clinical Laboratory   Improvement Amendments of 1988 (CLIA-88) as qualified to   perform high-complexity testing. This test is used for   clinical purposes. It should not be regarded as   investigational or for research.     Results for orders placed or performed in visit on 04/14/22   CBC with platelets and differential     Status: None (In process)    Narrative    The following orders were created for panel order CBC with platelets and differential.  Procedure                               Abnormality         Status                     ---------                               -----------         ------                     CBC with platelets and d...[[id number]]                      In process                   Please view results for these tests on the individual orders.     Results for orders placed or performed in visit on 04/14/22 (from the past 24 hour(s))   CBC with platelets and differential    Narrative    The following orders were created for panel order CBC with platelets and differential.  Procedure                               Abnormality         Status                     ---------                               -----------         ------                     CBC with platelets and d...[[id number]]                      In process                   Please view results for these tests on the individual orders.

## 2022-04-14 ENCOUNTER — OFFICE VISIT (OUTPATIENT)
Dept: INTERNAL MEDICINE | Facility: OTHER | Age: 47
End: 2022-04-14
Attending: INTERNAL MEDICINE
Payer: COMMERCIAL

## 2022-04-14 VITALS
SYSTOLIC BLOOD PRESSURE: 150 MMHG | DIASTOLIC BLOOD PRESSURE: 100 MMHG | OXYGEN SATURATION: 95 % | BODY MASS INDEX: 40.43 KG/M2 | WEIGHT: 315 LBS | HEIGHT: 74 IN | HEART RATE: 72 BPM | TEMPERATURE: 98.1 F

## 2022-04-14 DIAGNOSIS — I10 PRIMARY HYPERTENSION: Primary | ICD-10-CM

## 2022-04-14 DIAGNOSIS — L20.84 INTRINSIC ECZEMA: ICD-10-CM

## 2022-04-14 DIAGNOSIS — R79.89 ELEVATED LFTS: Primary | ICD-10-CM

## 2022-04-14 LAB
ALBUMIN SERPL-MCNC: 3.7 G/DL (ref 3.4–5)
ALP SERPL-CCNC: 86 U/L (ref 40–150)
ALT SERPL W P-5'-P-CCNC: 99 U/L (ref 0–70)
ANION GAP SERPL CALCULATED.3IONS-SCNC: 3 MMOL/L (ref 3–14)
AST SERPL W P-5'-P-CCNC: 57 U/L (ref 0–45)
BASOPHILS # BLD AUTO: 0 10E3/UL (ref 0–0.2)
BASOPHILS NFR BLD AUTO: 0 %
BILIRUB SERPL-MCNC: 0.8 MG/DL (ref 0.2–1.3)
BUN SERPL-MCNC: 25 MG/DL (ref 7–30)
CALCIUM SERPL-MCNC: 9.3 MG/DL (ref 8.5–10.1)
CHLORIDE BLD-SCNC: 104 MMOL/L (ref 94–109)
CO2 SERPL-SCNC: 30 MMOL/L (ref 20–32)
CREAT SERPL-MCNC: 1.3 MG/DL (ref 0.66–1.25)
EOSINOPHIL # BLD AUTO: 0.5 10E3/UL (ref 0–0.7)
EOSINOPHIL NFR BLD AUTO: 5 %
ERYTHROCYTE [DISTWIDTH] IN BLOOD BY AUTOMATED COUNT: 13.3 % (ref 10–15)
GFR SERPL CREATININE-BSD FRML MDRD: 69 ML/MIN/1.73M2
GLUCOSE BLD-MCNC: 110 MG/DL (ref 70–99)
HCT VFR BLD AUTO: 47.8 % (ref 40–53)
HGB BLD-MCNC: 17.7 G/DL (ref 13.3–17.7)
LYMPHOCYTES # BLD AUTO: 2.9 10E3/UL (ref 0.8–5.3)
LYMPHOCYTES NFR BLD AUTO: 33 %
MCH RBC QN AUTO: 29.3 PG (ref 26.5–33)
MCHC RBC AUTO-ENTMCNC: 37 G/DL (ref 31.5–36.5)
MCV RBC AUTO: 79 FL (ref 78–100)
MONOCYTES # BLD AUTO: 0.7 10E3/UL (ref 0–1.3)
MONOCYTES NFR BLD AUTO: 8 %
NEUTROPHILS # BLD AUTO: 4.6 10E3/UL (ref 1.6–8.3)
NEUTROPHILS NFR BLD AUTO: 53 %
PLATELET # BLD AUTO: 215 10E3/UL (ref 150–450)
POTASSIUM BLD-SCNC: 4.5 MMOL/L (ref 3.4–5.3)
PROT SERPL-MCNC: 8.2 G/DL (ref 6.8–8.8)
RBC # BLD AUTO: 6.04 10E6/UL (ref 4.4–5.9)
SODIUM SERPL-SCNC: 137 MMOL/L (ref 133–144)
WBC # BLD AUTO: 8.6 10E3/UL (ref 4–11)

## 2022-04-14 PROCEDURE — 93000 ELECTROCARDIOGRAM COMPLETE: CPT | Mod: 77 | Performed by: INTERNAL MEDICINE

## 2022-04-14 PROCEDURE — 99204 OFFICE O/P NEW MOD 45 MIN: CPT | Mod: 25 | Performed by: INTERNAL MEDICINE

## 2022-04-14 PROCEDURE — 80053 COMPREHEN METABOLIC PANEL: CPT | Performed by: INTERNAL MEDICINE

## 2022-04-14 PROCEDURE — 85025 COMPLETE CBC W/AUTO DIFF WBC: CPT | Performed by: INTERNAL MEDICINE

## 2022-04-14 PROCEDURE — 36415 COLL VENOUS BLD VENIPUNCTURE: CPT | Performed by: INTERNAL MEDICINE

## 2022-04-14 RX ORDER — MOMETASONE FUROATE 1 MG/G
CREAM TOPICAL
Qty: 45 G | Refills: 1 | Status: SHIPPED | OUTPATIENT
Start: 2022-04-14

## 2022-04-14 RX ORDER — METOPROLOL SUCCINATE 25 MG/1
25 TABLET, EXTENDED RELEASE ORAL DAILY
Qty: 30 TABLET | Refills: 3 | Status: SHIPPED | OUTPATIENT
Start: 2022-04-14 | End: 2022-05-09

## 2022-04-14 ASSESSMENT — PAIN SCALES - GENERAL: PAINLEVEL: NO PAIN (0)

## 2022-04-14 ASSESSMENT — ANXIETY QUESTIONNAIRES
7. FEELING AFRAID AS IF SOMETHING AWFUL MIGHT HAPPEN: NOT AT ALL
5. BEING SO RESTLESS THAT IT IS HARD TO SIT STILL: NOT AT ALL
4. TROUBLE RELAXING: NOT AT ALL
GAD7 TOTAL SCORE: 0
1. FEELING NERVOUS, ANXIOUS, OR ON EDGE: NOT AT ALL
6. BECOMING EASILY ANNOYED OR IRRITABLE: NOT AT ALL
2. NOT BEING ABLE TO STOP OR CONTROL WORRYING: NOT AT ALL
3. WORRYING TOO MUCH ABOUT DIFFERENT THINGS: NOT AT ALL

## 2022-04-14 ASSESSMENT — PATIENT HEALTH QUESTIONNAIRE - PHQ9: SUM OF ALL RESPONSES TO PHQ QUESTIONS 1-9: 0

## 2022-04-14 NOTE — LETTER
April 14, 2022      Pan Marroquin  [address] [facility] [address]        To Whom It May Concern:    Pan Marroquin  was seen on 4/14/2022.  Please excuse him from work for this appointment.        Sincerely,        Karel Gonzalez, DO

## 2022-04-14 NOTE — NURSING NOTE
"Chief Complaint   Patient presents with     Establish Care       Initial BP (!) 150/100 (BP Location: Left arm, Patient Position: Chair, Cuff Size: Adult Large)   Pulse 72   Temp 98.1  F (36.7  C) (Tympanic)   Ht 1.88 m (6' 2\")   Wt (!) 157.9 kg (348 lb)   SpO2 95%   BMI 44.68 kg/m   Estimated body mass index is 44.68 kg/m  as calculated from the following:    Height as of this encounter: 1.88 m (6' 2\").    Weight as of this encounter: 157.9 kg (348 lb).  Medication Reconciliation: complete  LOTTIE KING LPN  "

## 2022-04-15 ASSESSMENT — ANXIETY QUESTIONNAIRES: GAD7 TOTAL SCORE: 0

## 2022-05-09 ENCOUNTER — OFFICE VISIT (OUTPATIENT)
Dept: INTERNAL MEDICINE | Facility: OTHER | Age: 47
End: 2022-05-09
Attending: INTERNAL MEDICINE
Payer: COMMERCIAL

## 2022-05-09 VITALS
SYSTOLIC BLOOD PRESSURE: 152 MMHG | HEART RATE: 67 BPM | DIASTOLIC BLOOD PRESSURE: 100 MMHG | OXYGEN SATURATION: 95 % | TEMPERATURE: 98.1 F

## 2022-05-09 DIAGNOSIS — N50.9 TESTICULAR ABNORMALITY: Primary | ICD-10-CM

## 2022-05-09 DIAGNOSIS — I10 PRIMARY HYPERTENSION: ICD-10-CM

## 2022-05-09 PROCEDURE — 99213 OFFICE O/P EST LOW 20 MIN: CPT | Performed by: INTERNAL MEDICINE

## 2022-05-09 RX ORDER — METOPROLOL SUCCINATE 25 MG/1
25 TABLET, EXTENDED RELEASE ORAL DAILY
Qty: 90 TABLET | Refills: 1 | Status: SHIPPED | OUTPATIENT
Start: 2022-05-09 | End: 2022-09-06

## 2022-05-09 ASSESSMENT — PAIN SCALES - GENERAL: PAINLEVEL: NO PAIN (0)

## 2022-05-09 NOTE — PROGRESS NOTES
"  Assessment & Plan   Problem List Items Addressed This Visit    None     Visit Diagnoses     Testicular abnormality    -  Primary    Relevant Orders    US Testicular & Scrotum w Doppler Ltd    Primary hypertension        Relevant Medications    metoprolol succinate ER (TOPROL XL) 25 MG 24 hr tablet             15 minutes spent on the date of the encounter doing chart review, review of test results, interpretation of tests, patient visit and documentation        BMI:   Estimated body mass index is 44.68 kg/m  as calculated from the following:    Height as of 4/14/22: 1.88 m (6' 2\").    Weight as of 4/14/22: 157.9 kg (348 lb).           No follow-ups on file.    Karel Gonzalez, [facility]    Srinath Perla is a 46 year old who presents for the following health issues     HPI       Pan presents today due to a small lump he noticed on his left testicle a week or two ago.  His wife noticed it also.  He denies any pain associated with it.            Review of Systems   Constitutional, HEENT, cardiovascular, pulmonary, gi and gu systems are negative, except as otherwise noted.      Objective    BP (!) 152/100 (BP Location: Left arm, Patient Position: Chair, Cuff Size: Adult Large)   Pulse 67   Temp 98.1  F (36.7  C)   SpO2 95%   There is no height or weight on file to calculate BMI.  Physical Exam   GENERAL: healthy, alert and no distress   (male): bb sized small lump palpated on left testicle.    PSYCH: mentation appears normal, affect normal/bright    Office Visit on 04/14/2022   Component Date Value Ref Range Status     Sodium 04/14/2022 137  133 - 144 mmol/L Final     Potassium 04/14/2022 4.5  3.4 - 5.3 mmol/L Final     Chloride 04/14/2022 104  94 - 109 mmol/L Final     Carbon Dioxide (CO2) 04/14/2022 30  20 - 32 mmol/L Final     Anion Gap 04/14/2022 3  3 - 14 mmol/L Final     Urea Nitrogen 04/14/2022 25  7 - 30 mg/dL Final     Creatinine 04/14/2022 1.30 (A) 0.66 - 1.25 " mg/dL Final     Calcium 04/14/2022 9.3  8.5 - 10.1 mg/dL Final     Glucose 04/14/2022 110 (A) 70 - 99 mg/dL Final     Alkaline Phosphatase 04/14/2022 86  40 - 150 U/L Final     AST 04/14/2022 57 (A) 0 - 45 U/L Final     ALT 04/14/2022 99 (A) 0 - 70 U/L Final     Protein Total 04/14/2022 8.2  6.8 - 8.8 g/dL Final     Albumin 04/14/2022 3.7  3.4 - 5.0 g/dL Final     Bilirubin Total 04/14/2022 0.8  0.2 - 1.3 mg/dL Final     GFR Estimate 04/14/2022 69  >60 mL/min/1.73m2 Final    Effective December 21, 2021 eGFRcr in adults is calculated using the 2021 CKD-EPI creatinine equation which includes age and gender (Ivan et al., [facility], DOI: 10.1056/FDKCie9919674)     WBC Count 04/14/2022 8.6  4.0 - 11.0 10e3/uL Final     RBC Count 04/14/2022 6.04 (A) 4.40 - 5.90 10e6/uL Final     Hemoglobin 04/14/2022 17.7  13.3 - 17.7 g/dL Final     Hematocrit 04/14/2022 47.8  40.0 - 53.0 % Final     MCV 04/14/2022 79  78 - 100 fL Final     MCH 04/14/2022 29.3  26.5 - 33.0 pg Final     MCHC 04/14/2022 37.0 (A) 31.5 - 36.5 g/dL Final     RDW 04/14/2022 13.3  10.0 - 15.0 % Final     Platelet Count 04/14/2022 215  150 - 450 10e3/uL Final     % Neutrophils 04/14/2022 53  % Final     % Lymphocytes 04/14/2022 33  % Final     % Monocytes 04/14/2022 8  % Final     % Eosinophils 04/14/2022 5  % Final     % Basophils 04/14/2022 0  % Final     Absolute Neutrophils 04/14/2022 4.6  1.6 - 8.3 10e3/uL Final     Absolute Lymphocytes 04/14/2022 2.9  0.8 - 5.3 10e3/uL Final     Absolute Monocytes 04/14/2022 0.7  0.0 - 1.3 10e3/uL Final     Absolute Eosinophils 04/14/2022 0.5  0.0 - 0.7 10e3/uL Final     Absolute Basophils 04/14/2022 0.0  0.0 - 0.2 10e3/uL Final     No results found for any visits on 05/09/22.  No results found for this or any previous visit (from the past 24 hour(s)).

## 2022-05-09 NOTE — NURSING NOTE
"Chief Complaint   Patient presents with     RECHECK       Initial BP (!) 152/100 (BP Location: Left arm, Patient Position: Chair, Cuff Size: Adult Large)   Pulse 67   Temp 98.1  F (36.7  C)   SpO2 95%  Estimated body mass index is 44.68 kg/m  as calculated from the following:    Height as of 4/14/22: 1.88 m (6' 2\").    Weight as of 4/14/22: 157.9 kg (348 lb).  Medication Reconciliation: complete  LOTTIE KING LPN    "

## 2022-05-13 ENCOUNTER — HOSPITAL ENCOUNTER (OUTPATIENT)
Dept: ULTRASOUND IMAGING | Facility: HOSPITAL | Age: 47
Discharge: HOME | End: 2022-05-13
Attending: INTERNAL MEDICINE | Admitting: INTERNAL MEDICINE
Payer: COMMERCIAL

## 2022-05-13 DIAGNOSIS — N50.9 TESTICULAR ABNORMALITY: ICD-10-CM

## 2022-05-13 PROCEDURE — 76870 US EXAM SCROTUM: CPT

## 2022-06-13 DIAGNOSIS — K21.00 GASTROESOPHAGEAL REFLUX DISEASE WITH ESOPHAGITIS, UNSPECIFIED WHETHER HEMORRHAGE: ICD-10-CM

## 2022-06-14 RX ORDER — ESOMEPRAZOLE MAGNESIUM 40 MG
CAPSULE,DELAYED RELEASE (ENTERIC COATED) ORAL
Qty: 90 CAPSULE | Refills: 3 | Status: SHIPPED | OUTPATIENT
Start: 2022-06-14 | End: 2023-07-28

## 2022-06-22 ENCOUNTER — TELEPHONE (OUTPATIENT)
Dept: INTERNAL MEDICINE | Facility: OTHER | Age: 47
End: 2022-06-22

## 2022-06-22 DIAGNOSIS — K21.00 GASTROESOPHAGEAL REFLUX DISEASE WITH ESOPHAGITIS WITHOUT HEMORRHAGE: Primary | ICD-10-CM

## 2022-06-22 NOTE — TELEPHONE ENCOUNTER
[facility] called stating the PA for the med below has  and they need a new one.  Insurance will not cover the med.  Patient has been taking it for 5 years.  Phone # to call back  [phone]  Ref#  [id number]    NEXIUM 40 MG DR capsule 90 capsule 3 2022  Yes   Sig: TAKE 1 CAPSULE EVERY MORNING BEFORE BREAKFAST 30 TO 60 MINUTES BEFORE EATING   Sent to pharmacy as: NexIUM 40 MG Oral Capsule Delayed Release   Class: E-Prescribe   Order: [id number]   E-Prescribing Status: Receipt confirmed by pharmacy (2022  4:24 PM CDT

## 2022-06-23 ENCOUNTER — TELEPHONE (OUTPATIENT)
Dept: INTERNAL MEDICINE | Facility: OTHER | Age: 47
End: 2022-06-23

## 2022-06-23 NOTE — TELEPHONE ENCOUNTER
PA states that med has been denied multiple times.  Is there an alternative to send to [facility] or does he need to take Nexium brand?

## 2022-06-27 RX ORDER — OMEPRAZOLE 40 MG/1
40 CAPSULE, DELAYED RELEASE ORAL DAILY
Qty: 30 CAPSULE | Refills: 4 | Status: SHIPPED | OUTPATIENT
Start: 2022-06-27 | End: 2022-10-19

## 2022-08-10 NOTE — TELEPHONE ENCOUNTER
A form was received on 08/04/22 from [facility] for Nexium. This was completed and faxed back. Received an APPROVAL from [facility]. Effective 07/19/2022 to 08/09/2023. Forms scanned to Epic.

## 2022-09-04 ENCOUNTER — HEALTH MAINTENANCE LETTER (OUTPATIENT)
Age: 47
End: 2022-09-04

## 2022-09-06 ENCOUNTER — OFFICE VISIT (OUTPATIENT)
Dept: INTERNAL MEDICINE | Facility: OTHER | Age: 47
End: 2022-09-06
Attending: INTERNAL MEDICINE
Payer: COMMERCIAL

## 2022-09-06 VITALS
OXYGEN SATURATION: 95 % | DIASTOLIC BLOOD PRESSURE: 106 MMHG | RESPIRATION RATE: 18 BRPM | WEIGHT: 315 LBS | SYSTOLIC BLOOD PRESSURE: 160 MMHG | TEMPERATURE: 97.5 F | HEART RATE: 76 BPM | BODY MASS INDEX: 43.04 KG/M2

## 2022-09-06 DIAGNOSIS — I10 PRIMARY HYPERTENSION: Primary | ICD-10-CM

## 2022-09-06 PROCEDURE — 99213 OFFICE O/P EST LOW 20 MIN: CPT | Performed by: INTERNAL MEDICINE

## 2022-09-06 RX ORDER — LISINOPRIL 40 MG/1
40 TABLET ORAL DAILY
Qty: 30 TABLET | Refills: 1 | Status: SHIPPED | OUTPATIENT
Start: 2022-09-06 | End: 2022-09-06

## 2022-09-06 RX ORDER — LISINOPRIL 40 MG/1
40 TABLET ORAL DAILY
Qty: 30 TABLET | Refills: 1 | Status: SHIPPED | OUTPATIENT
Start: 2022-09-06 | End: 2022-10-19

## 2022-09-06 ASSESSMENT — PAIN SCALES - GENERAL: PAINLEVEL: NO PAIN (0)

## 2022-09-06 NOTE — PROGRESS NOTES
"  Assessment & Plan   Problem List Items Addressed This Visit         Visit Diagnoses     Primary hypertension    -  Primary    Relevant Medications    lisinopril (ZESTRIL) 40 MG tablet    Other Relevant Orders    Basic metabolic panel         Nurse visit for BP check and BMP in 1-2 weeks.        15 minutes spent on the date of the encounter doing chart review, review of test results, interpretation of tests, patient visit and documentation        BMI:   Estimated body mass index is 43.04 kg/m  as calculated from the following:    Height as of 4/14/22: 1.88 m (6' 2\").    Weight as of this encounter: 152 kg (335 lb 3.2 oz).           No follow-ups on file.    Karel Gonzalez, [facility] JARRETT Perla is a 47 year old, presenting for the following health issues:  Recheck Medication      HPI     Pan presents today for follow up/  He was on Toprol XL 25 mg for his HTN, but [facility] at 170s systolic.  He describes a warm sensation after he takes the medication also.  [facility].  HE denies any chest pain, SOB or palpations.  Vague report of headache.        Hypertension Follow-up      Do you check your blood pressure regularly outside of the clinic? Yes     Are you following a low salt diet? Yes    Are your blood pressures ever more than 140 on the top number (systolic) OR more   than 90 on the bottom number (diastolic), for example 140/90? Yes      Review of Systems   Constitutional, HEENT, cardiovascular, pulmonary, gi and gu systems are negative, except as otherwise noted.      Objective    BP (!) 160/106 (BP Location: Left arm, Patient Position: Chair, Cuff Size: Adult Large)   Pulse 76   Temp 97.5  F (36.4  C) (Tympanic)   Resp 18   Wt (!) 152 kg (335 lb 3.2 oz)   SpO2 95%   BMI 43.04 kg/m    Body mass index is 43.04 kg/m .  Physical Exam   GENERAL: healthy, alert and no distress  RESP: lungs clear to auscultation - no " rales, rhonchi or wheezes  CV: regular rate and rhythm, normal S1 S2, no S3 or S4, no murmur, click or rub, no peripheral edema and peripheral pulses strong  MS: no gross musculoskeletal defects noted, no edema  SKIN: no suspicious lesions or rashes  NEURO: Normal strength and tone, mentation intact and speech normal  PSYCH: mentation appears normal, affect normal/bright    Office Visit on 04/14/2022   Component Date Value Ref Range Status     Sodium 04/14/2022 137  133 - 144 mmol/L Final     Potassium 04/14/2022 4.5  3.4 - 5.3 mmol/L Final     Chloride 04/14/2022 104  94 - 109 mmol/L Final     Carbon Dioxide (CO2) 04/14/2022 30  20 - 32 mmol/L Final     Anion Gap 04/14/2022 3  3 - 14 mmol/L Final     Urea Nitrogen 04/14/2022 25  7 - 30 mg/dL Final     Creatinine 04/14/2022 1.30 (A) 0.66 - 1.25 mg/dL Final     Calcium 04/14/2022 9.3  8.5 - 10.1 mg/dL Final     Glucose 04/14/2022 110 (A) 70 - 99 mg/dL Final     Alkaline Phosphatase 04/14/2022 86  40 - 150 U/L Final     AST 04/14/2022 57 (A) 0 - 45 U/L Final     ALT 04/14/2022 99 (A) 0 - 70 U/L Final     Protein Total 04/14/2022 8.2  6.8 - 8.8 g/dL Final     Albumin 04/14/2022 3.7  3.4 - 5.0 g/dL Final     Bilirubin Total 04/14/2022 0.8  0.2 - 1.3 mg/dL Final     GFR Estimate 04/14/2022 69  >60 mL/min/1.73m2 Final    Effective December 21, 2021 eGFRcr in adults is calculated using the 2021 CKD-EPI creatinine equation which includes age and gender (Ivan et al., NEJM, DOI: 10.1056/XTDBqi6615354)     WBC Count 04/14/2022 8.6  4.0 - 11.0 10e3/uL Final     RBC Count 04/14/2022 6.04 (A) 4.40 - 5.90 10e6/uL Final     Hemoglobin 04/14/2022 17.7  13.3 - 17.7 g/dL Final     Hematocrit 04/14/2022 47.8  40.0 - 53.0 % Final     MCV 04/14/2022 79  78 - 100 fL Final     MCH 04/14/2022 29.3  26.5 - 33.0 pg Final     MCHC 04/14/2022 37.0 (A) 31.5 - 36.5 g/dL Final     RDW 04/14/2022 13.3  10.0 - 15.0 % Final     Platelet Count 04/14/2022 215  150 - 450 10e3/uL Final     % Neutrophils  04/14/2022 53  % Final     % Lymphocytes 04/14/2022 33  % Final     % Monocytes 04/14/2022 8  % Final     % Eosinophils 04/14/2022 5  % Final     % Basophils 04/14/2022 0  % Final     Absolute Neutrophils 04/14/2022 4.6  1.6 - 8.3 10e3/uL Final     Absolute Lymphocytes 04/14/2022 2.9  0.8 - 5.3 10e3/uL Final     Absolute Monocytes 04/14/2022 0.7  0.0 - 1.3 10e3/uL Final     Absolute Eosinophils 04/14/2022 0.5  0.0 - 0.7 10e3/uL Final     Absolute Basophils 04/14/2022 0.0  0.0 - 0.2 10e3/uL Final     No results found for any visits on 09/06/22.  No results found for this or any previous visit (from the past 24 hour(s)).

## 2022-09-06 NOTE — NURSING NOTE
"Chief Complaint   Patient presents with     Recheck Medication       Initial BP (!) 160/106 (BP Location: Left arm, Patient Position: Chair, Cuff Size: Adult Large)   Pulse 76   Temp 97.5  F (36.4  C) (Tympanic)   Resp 18   Wt (!) 152 kg (335 lb 3.2 oz)   SpO2 95%   BMI 43.04 kg/m   Estimated body mass index is 43.04 kg/m  as calculated from the following:    Height as of 4/14/22: 1.88 m (6' 2\").    Weight as of this encounter: 152 kg (335 lb 3.2 oz).  Medication Reconciliation: complete  Abena Bellamy    "

## 2022-09-13 ENCOUNTER — ALLIED HEALTH/NURSE VISIT (OUTPATIENT)
Dept: FAMILY MEDICINE | Facility: OTHER | Age: 47
End: 2022-09-13
Attending: INTERNAL MEDICINE
Payer: COMMERCIAL

## 2022-09-13 ENCOUNTER — LAB (OUTPATIENT)
Dept: LAB | Facility: OTHER | Age: 47
End: 2022-09-13
Attending: INTERNAL MEDICINE
Payer: COMMERCIAL

## 2022-09-13 VITALS — DIASTOLIC BLOOD PRESSURE: 70 MMHG | SYSTOLIC BLOOD PRESSURE: 130 MMHG | OXYGEN SATURATION: 95 % | HEART RATE: 83 BPM

## 2022-09-13 DIAGNOSIS — R79.89 ELEVATED LFTS: ICD-10-CM

## 2022-09-13 DIAGNOSIS — I10 PRIMARY HYPERTENSION: ICD-10-CM

## 2022-09-13 DIAGNOSIS — E66.01 MORBID OBESITY (H): Primary | ICD-10-CM

## 2022-09-13 LAB
ALBUMIN SERPL-MCNC: 3.8 G/DL (ref 3.4–5)
ALP SERPL-CCNC: 85 U/L (ref 40–150)
ALT SERPL W P-5'-P-CCNC: 61 U/L (ref 0–70)
ANION GAP SERPL CALCULATED.3IONS-SCNC: 6 MMOL/L (ref 3–14)
AST SERPL W P-5'-P-CCNC: 33 U/L (ref 0–45)
BILIRUB SERPL-MCNC: 0.5 MG/DL (ref 0.2–1.3)
BUN SERPL-MCNC: 27 MG/DL (ref 7–30)
CALCIUM SERPL-MCNC: 8.7 MG/DL (ref 8.5–10.1)
CHLORIDE BLD-SCNC: 104 MMOL/L (ref 94–109)
CO2 SERPL-SCNC: 27 MMOL/L (ref 20–32)
CREAT SERPL-MCNC: 1.47 MG/DL (ref 0.66–1.25)
GFR SERPL CREATININE-BSD FRML MDRD: 59 ML/MIN/1.73M2
GLUCOSE BLD-MCNC: 117 MG/DL (ref 70–99)
HOLD SPECIMEN: NORMAL
POTASSIUM BLD-SCNC: 4.3 MMOL/L (ref 3.4–5.3)
PROT SERPL-MCNC: 7.7 G/DL (ref 6.8–8.8)
SODIUM SERPL-SCNC: 137 MMOL/L (ref 133–144)

## 2022-09-13 PROCEDURE — 80053 COMPREHEN METABOLIC PANEL: CPT

## 2022-09-13 PROCEDURE — 36415 COLL VENOUS BLD VENIPUNCTURE: CPT

## 2022-09-13 PROCEDURE — 99207 PR NO CHARGE NURSE ONLY: CPT

## 2022-09-20 ENCOUNTER — TELEPHONE (OUTPATIENT)
Dept: INTERNAL MEDICINE | Facility: OTHER | Age: 47
End: 2022-09-20

## 2022-09-20 DIAGNOSIS — I10 PRIMARY HYPERTENSION: ICD-10-CM

## 2022-09-20 NOTE — TELEPHONE ENCOUNTER
Patient returning call on lab results from 9/13/22. Notified of results and request for follow up in one month per Dr. Gonzalez.     Karel Gonzalez DO   9/14/2022  8:01 AM CDT         Labs look ok, but Cr slightly high, would like to see him back in 1 month for recheck           Appointment scheduled:    Next 5 appointments (look out 90 days)    Oct 19, 2022  2:30 PM  (Arrive by 2:15 PM)  SHORT with Karel Gonzalez DO  [facility] [address]  [place] [id number] [phone]        Unable to document within lab encounter as lab encounter was closed.

## 2022-09-21 RX ORDER — LISINOPRIL 40 MG/1
40 TABLET ORAL DAILY
Qty: 90 TABLET | Refills: 3 | OUTPATIENT
Start: 2022-09-21

## 2022-10-19 ENCOUNTER — OFFICE VISIT (OUTPATIENT)
Dept: INTERNAL MEDICINE | Facility: OTHER | Age: 47
End: 2022-10-19
Attending: INTERNAL MEDICINE
Payer: COMMERCIAL

## 2022-10-19 VITALS
WEIGHT: 315 LBS | SYSTOLIC BLOOD PRESSURE: 128 MMHG | BODY MASS INDEX: 42.65 KG/M2 | HEART RATE: 62 BPM | TEMPERATURE: 98 F | OXYGEN SATURATION: 96 % | DIASTOLIC BLOOD PRESSURE: 80 MMHG

## 2022-10-19 DIAGNOSIS — I10 PRIMARY HYPERTENSION: ICD-10-CM

## 2022-10-19 PROCEDURE — 99213 OFFICE O/P EST LOW 20 MIN: CPT | Performed by: INTERNAL MEDICINE

## 2022-10-19 RX ORDER — LISINOPRIL 40 MG/1
40 TABLET ORAL DAILY
Qty: 90 TABLET | Refills: 3 | Status: SHIPPED | OUTPATIENT
Start: 2022-10-19 | End: 2022-10-19

## 2022-10-19 ASSESSMENT — PAIN SCALES - GENERAL: PAINLEVEL: NO PAIN (0)

## 2022-10-19 NOTE — PROGRESS NOTES
"  Assessment & Plan   Problem List Items Addressed This Visit    None  Visit Diagnoses     Creatinine elevation    -  Primary    Primary hypertension        Relevant Orders    Basic metabolic panel             15 minutes spent on the date of the encounter doing chart review, review of test results, interpretation of tests, patient visit and documentation        BMI:   Estimated body mass index is 42.65 kg/m  as calculated from the following:    Height as of 4/14/22: 1.88 m (6' 2\").    Weight as of this encounter: 150.7 kg (332 lb 3.2 oz).           No follow-ups on file.    Karel Gonzalez, [facility] JARRETT Perla is a 47 year old, presenting for the following health issues:  Hypertension      HPI       Pan presents today for follow up of his HTN.  BP is at goal and feels well, but he did have a BMP done 1 week after starting it and noted his Cr was up to 1.47.  He remains on the medication but needs a repeat BMP to determine if he can continue on this.    Hypertension Follow-up      Do you check your blood pressure regularly outside of the clinic? No     Are you following a low salt diet? Yes    Are your blood pressures ever more than 140 on the top number (systolic) OR more   than 90 on the bottom number (diastolic), for example 140/90? No    Lab results- creatinine         Review of Systems   Constitutional, HEENT, cardiovascular, pulmonary, gi and gu systems are negative, except as otherwise noted.      Objective    /80 (BP Location: Left arm, Patient Position: Sitting, Cuff Size: Adult Large)   Pulse 62   Temp 98  F (36.7  C) (Tympanic)   Wt (!) 150.7 kg (332 lb 3.2 oz)   SpO2 96%   BMI 42.65 kg/m    Body mass index is 42.65 kg/m .  Physical Exam   GENERAL: healthy, alert and no distress  PSYCH: mentation appears normal, affect normal/bright    Lab on 09/13/2022   Component Date Value Ref Range Status     Sodium 09/13/2022 137  133 - 144 mmol/L Final     " Potassium 09/13/2022 4.3  3.4 - 5.3 mmol/L Final     Chloride 09/13/2022 104  94 - 109 mmol/L Final     Carbon Dioxide (CO2) 09/13/2022 27  20 - 32 mmol/L Final     Anion Gap 09/13/2022 6  3 - 14 mmol/L Final     Urea Nitrogen 09/13/2022 27  7 - 30 mg/dL Final     Creatinine 09/13/2022 1.47 (H)  0.66 - 1.25 mg/dL Final     Calcium 09/13/2022 8.7  8.5 - 10.1 mg/dL Final     Glucose 09/13/2022 117 (H)  70 - 99 mg/dL Final     Alkaline Phosphatase 09/13/2022 85  40 - 150 U/L Final     AST 09/13/2022 33  0 - 45 U/L Final     ALT 09/13/2022 61  0 - 70 U/L Final     Protein Total 09/13/2022 7.7  6.8 - 8.8 g/dL Final     Albumin 09/13/2022 3.8  3.4 - 5.0 g/dL Final     Bilirubin Total 09/13/2022 0.5  0.2 - 1.3 mg/dL Final     GFR Estimate 09/13/2022 59 (L)  >60 mL/min/1.73m2 Final    Effective December 21, 2021 eGFRcr in adults is calculated using the 2021 CKD-EPI creatinine equation which includes age and gender (Ivan et al., NEJ, DOI: 10.1056/NQBEwb0990744)     Hold Specimen 09/13/2022 [facility]   Final     No results found for any visits on 10/19/22.  No results found for this or any previous visit (from the past 24 hour(s)).

## 2022-10-20 ENCOUNTER — LAB (OUTPATIENT)
Dept: LAB | Facility: OTHER | Age: 47
End: 2022-10-20
Payer: COMMERCIAL

## 2022-10-20 DIAGNOSIS — I10 PRIMARY HYPERTENSION: ICD-10-CM

## 2022-10-20 LAB
ANION GAP SERPL CALCULATED.3IONS-SCNC: 11 MMOL/L (ref 7–15)
BUN SERPL-MCNC: 24.1 MG/DL (ref 6–20)
CALCIUM SERPL-MCNC: 8.8 MG/DL (ref 8.6–10)
CHLORIDE SERPL-SCNC: 102 MMOL/L (ref 98–107)
CREAT SERPL-MCNC: 1.16 MG/DL (ref 0.67–1.17)
DEPRECATED HCO3 PLAS-SCNC: 25 MMOL/L (ref 22–29)
GFR SERPL CREATININE-BSD FRML MDRD: 78 ML/MIN/1.73M2
GLUCOSE SERPL-MCNC: 113 MG/DL (ref 70–99)
HOLD SPECIMEN: NORMAL
POTASSIUM SERPL-SCNC: 4.2 MMOL/L (ref 3.4–5.3)
SODIUM SERPL-SCNC: 138 MMOL/L (ref 136–145)

## 2022-10-20 PROCEDURE — 80048 BASIC METABOLIC PNL TOTAL CA: CPT

## 2022-10-20 PROCEDURE — 36415 COLL VENOUS BLD VENIPUNCTURE: CPT

## 2022-10-24 ENCOUNTER — TELEPHONE (OUTPATIENT)
Dept: FAMILY MEDICINE | Facility: OTHER | Age: 47
End: 2022-10-24

## 2022-10-24 ENCOUNTER — TELEPHONE (OUTPATIENT)
Dept: INTERNAL MEDICINE | Facility: OTHER | Age: 47
End: 2022-10-24

## 2022-10-24 DIAGNOSIS — I10 PRIMARY HYPERTENSION: Primary | ICD-10-CM

## 2022-10-24 NOTE — TELEPHONE ENCOUNTER
Spoke with patient on lab results. Patient notified normal results per Yareli Jj CNP. Patient inquiring on prescription for lisinopril 40 mg.     Patient reports Dr. Gonzalez was awaiting lab results prior to refilling.     Pharmacy: [facility]

## 2022-10-24 NOTE — TELEPHONE ENCOUNTER
Call returned to patient notified of normal lab results from 10/20/22 per Yareli Jj.  Patient inquiring on prescription for lisinopril since the labs were normal.     Notified will discuss with Dr. Gonzalez on 10/25/22.    Patient verbalized understanding, requesting prescription to go to [facility] if approved.

## 2022-10-24 NOTE — TELEPHONE ENCOUNTER
11:44 AM    Reason for Call: Phone Call    Description: Patient called stating Tressa from Dr. Gonzalez's office called and he was calling back. Please call patient back after 3pm    Was an appointment offered for this call? No  If yes : Appointment type              Date    Preferred method for responding to this message: Telephone Call  What is your phone number ? [phone]    If we cannot reach you directly, may we leave a detailed response at the number you provided? Yes    Can this message wait until your PCP/provider returns, if available today? Not applicable, provider in clinic today    Carmen Pat

## 2022-10-25 RX ORDER — LISINOPRIL 40 MG/1
40 TABLET ORAL DAILY
Qty: 90 TABLET | Refills: 1 | Status: SHIPPED | OUTPATIENT
Start: 2022-10-25 | End: 2023-02-27

## 2022-10-25 NOTE — TELEPHONE ENCOUNTER
Call placed to patient, notified Dr. Gonzalez sent Rx for Lisinopril 40 mg to [facility].    Patient verbalized understanding.

## 2023-01-15 ENCOUNTER — HEALTH MAINTENANCE LETTER (OUTPATIENT)
Age: 48
End: 2023-01-15

## 2023-02-24 NOTE — PROGRESS NOTES
"    Assessment & Plan   Problem List Items Addressed This Visit    None  Visit Diagnoses     Primary hypertension    -  Primary    Relevant Medications    amLODIPine (NORVASC) 10 MG tablet             15 minutes spent on the date of the encounter doing chart review, review of test results, interpretation of tests, patient visit and documentation        BMI:   Estimated body mass index is 42.47 kg/m  as calculated from the following:    Height as of 4/14/22: 1.88 m (6' 2\").    Weight as of this encounter: 150 kg (330 lb 12.8 oz).       Karel Gonzalez, [facility] [place] [facility]    Subjective   Pan is a 47 year old, presenting for the following health issues:  Recheck Medication      HPI       Pan presents today for follow up.  He was started on Lisinopril for HTN and noticed increased joint pains and myalgias.  He decided to stop the Lisinopril and his symptoms resolved almost immediately but now his BP is elevated again.    Concern - experiencing joint/muscle pain since starting on lisinopril   Onset: lisinopril started on 10/25/22- joint/muscle pain starting x 6 weeks after being on medication- continue to worsen while taking the medication . Patient discontinued lisinopril x 2 weeks ago. Muscle and joint pain subsided within 3 days of being off lisinopril.   Description: joint and muscle pain   Intensity: subsided since discontinuing lisinopril   Progression of Symptoms:  improving  Accompanying Signs & Symptoms: none   Previous history of similar problem: na  Precipitating factors:        Worsened by: lisinopril   Alleviating factors:        Improved by: discontinuing lisinopril   Therapies tried and outcome: stopped lisinopril         Review of Systems   Constitutional, HEENT, cardiovascular, pulmonary, gi and gu systems are negative, except as otherwise noted.       Objective    BP (!) 147/95 (BP Location: Left arm, Patient Position: Sitting, Cuff Size: Adult Large)   Pulse 72   Temp 98.6 "  F (37  C) (Tympanic)   Resp 20   Wt (!) 150 kg (330 lb 12.8 oz)   SpO2 96%   BMI 42.47 kg/m    Body mass index is 42.47 kg/m .  Physical Exam   GENERAL: healthy, alert and no distress  PSYCH: mentation appears normal, affect normal/bright    Lab on 10/20/2022   Component Date Value Ref Range Status     Sodium 10/20/2022 138  136 - 145 mmol/L Final     Potassium 10/20/2022 4.2  3.4 - 5.3 mmol/L Final     Chloride 10/20/2022 102  98 - 107 mmol/L Final     Carbon Dioxide (CO2) 10/20/2022 25  22 - 29 mmol/L Final     Anion Gap 10/20/2022 11  7 - 15 mmol/L Final     Urea Nitrogen 10/20/2022 24.1 (H)  6.0 - 20.0 mg/dL Final     Creatinine 10/20/2022 1.16  0.67 - 1.17 mg/dL Final     Calcium 10/20/2022 8.8  8.6 - 10.0 mg/dL Final     Glucose 10/20/2022 113 (H)  70 - 99 mg/dL Final     GFR Estimate 10/20/2022 78  >60 mL/min/1.73m2 Final    Effective December 21, 2021 eGFRcr in adults is calculated using the 2021 CKD-EPI creatinine equation which includes age and gender (Ivan et al., NEJM, DOI: 10.1056/PBSDsc8763370)     Hold Specimen 10/20/2022 JI   Final     No results found for any visits on 02/27/23.  No results found for this or any previous visit (from the past 24 hour(s)).

## 2023-02-27 ENCOUNTER — OFFICE VISIT (OUTPATIENT)
Dept: INTERNAL MEDICINE | Facility: OTHER | Age: 48
End: 2023-02-27
Attending: INTERNAL MEDICINE
Payer: COMMERCIAL

## 2023-02-27 VITALS
RESPIRATION RATE: 20 BRPM | SYSTOLIC BLOOD PRESSURE: 147 MMHG | TEMPERATURE: 98.6 F | BODY MASS INDEX: 42.47 KG/M2 | WEIGHT: 315 LBS | OXYGEN SATURATION: 96 % | DIASTOLIC BLOOD PRESSURE: 95 MMHG | HEART RATE: 72 BPM

## 2023-02-27 DIAGNOSIS — I10 PRIMARY HYPERTENSION: Primary | ICD-10-CM

## 2023-02-27 PROCEDURE — 99213 OFFICE O/P EST LOW 20 MIN: CPT | Performed by: INTERNAL MEDICINE

## 2023-02-27 RX ORDER — AMLODIPINE BESYLATE 10 MG/1
10 TABLET ORAL DAILY
Qty: 30 TABLET | Refills: 3 | Status: SHIPPED | OUTPATIENT
Start: 2023-02-27 | End: 2023-05-10

## 2023-02-27 ASSESSMENT — PAIN SCALES - GENERAL: PAINLEVEL: NO PAIN (0)

## 2023-05-10 DIAGNOSIS — I10 PRIMARY HYPERTENSION: ICD-10-CM

## 2023-05-10 RX ORDER — AMLODIPINE BESYLATE 10 MG/1
10 TABLET ORAL DAILY
Qty: 90 TABLET | Refills: 0 | Status: SHIPPED | OUTPATIENT
Start: 2023-05-10 | End: 2023-07-24

## 2023-05-10 NOTE — TELEPHONE ENCOUNTER
amLODIPine (NORVASC) 10 MG tablet      Last Written Prescription Date:  2/24/2023  Last Fill Quantity: 30,   # refills: 3  Last Office Visit: 2/27/2023  Future Office visit:       Routing refill request to provider for review/approval because:   Calcium Channel Blockers Protocol  Failed 05/10/2023 07:40 AM   Protocol Details  Blood pressure under 140/90 in past 12 months          Kimberly Boecker, RN

## 2023-07-20 DIAGNOSIS — I10 PRIMARY HYPERTENSION: ICD-10-CM

## 2023-07-21 NOTE — TELEPHONE ENCOUNTER
amLODIPine (NORVASC) 10 MG tablet 90 tablet 0 5/10/2023     Last Office Visit: 2/27/2023  Future Office visit:       Routing refill request to provider for review/approval because:

## 2023-07-24 RX ORDER — AMLODIPINE BESYLATE 10 MG/1
TABLET ORAL
Qty: 90 TABLET | Refills: 3 | Status: SHIPPED | OUTPATIENT
Start: 2023-07-24 | End: 2024-07-18

## 2023-07-26 DIAGNOSIS — K21.00 GASTROESOPHAGEAL REFLUX DISEASE WITH ESOPHAGITIS, UNSPECIFIED WHETHER HEMORRHAGE: ICD-10-CM

## 2023-07-27 NOTE — TELEPHONE ENCOUNTER
Nexium 40 MG capsule    Last Written Prescription Date:  06/14/2022  Last Fill Quantity: 90,   # refills: 3  Last Office Visit: 02/27/2023

## 2023-07-28 RX ORDER — ESOMEPRAZOLE MAGNESIUM 40 MG
CAPSULE,DELAYED RELEASE (ENTERIC COATED) ORAL
Qty: 90 CAPSULE | Refills: 1 | Status: SHIPPED | OUTPATIENT
Start: 2023-07-28 | End: 2023-12-07

## 2023-11-07 ENCOUNTER — TELEPHONE (OUTPATIENT)
Dept: PULMONOLOGY | Facility: OTHER | Age: 48
End: 2023-11-07

## 2023-11-15 DIAGNOSIS — R06.83 SNORING: ICD-10-CM

## 2023-11-15 DIAGNOSIS — G47.00 INSOMNIA WITH SLEEP APNEA: Primary | ICD-10-CM

## 2023-11-15 DIAGNOSIS — G47.30 INSOMNIA WITH SLEEP APNEA: Primary | ICD-10-CM

## 2023-11-17 ENCOUNTER — TELEPHONE (OUTPATIENT)
Dept: INTERNAL MEDICINE | Facility: OTHER | Age: 48
End: 2023-11-17

## 2023-11-17 NOTE — TELEPHONE ENCOUNTER
RN CC will discuss Letter with Dr. Gonzalez on 11/20/23.    Upon review of refills for Nexium, patient should have medication to last through 1/27/24.    NEXIUM 40 MG DR capsule 90 capsule 1 7/28/2023  Yes   Sig: TAKE 1 CAPSULE EVERY MORNING BEFORE BREAKFAST 30 TO 60 MINUTES BEFORE EATING   Sent to pharmacy as: NexIUM 40 MG Oral Capsule Delayed Release   Class: E-Prescribe   Order: [id number]   E-Prescribing Status: Receipt confirmed by pharmacy (7/28/2023 11:06 AM CDT)

## 2023-11-17 NOTE — TELEPHONE ENCOUNTER
Pt returning call from [place], please call pt. If cannot do letter today, please put in a prescription for 30 days to [facility]. Pt is at work.

## 2023-11-17 NOTE — TELEPHONE ENCOUNTER
8:42 AM    Reason for Call: Phone Call    Description: pt called wanted to have Dr. Gonzalez send in a letter stating why he has to use the name brand NEXIUM instead of the generic. Pt states Dr Gonzalez has done this in the past     Was an appointment offered for this call? No  If yes : Appointment type              Date    Preferred method for responding to this message: Telephone Call  What is your phone number ? [phone]    If we cannot reach you directly, may we leave a detailed response at the number you provided? Yes    Can this message wait until your PCP/provider returns, if available today? Nicki Castro

## 2023-11-17 NOTE — TELEPHONE ENCOUNTER
Call placed to patient, message left a call will be returned to patient when Dr. Gonzalez returns to the office on 11/20/23.

## 2023-11-22 ENCOUNTER — VIRTUAL VISIT (OUTPATIENT)
Dept: SLEEP MEDICINE | Facility: HOSPITAL | Age: 48
End: 2023-11-22
Attending: FAMILY MEDICINE
Payer: COMMERCIAL

## 2023-11-22 DIAGNOSIS — G47.33 OSA (OBSTRUCTIVE SLEEP APNEA): Primary | ICD-10-CM

## 2023-11-22 NOTE — PROGRESS NOTES
Patient was provided both verbal and written education and instructions on use of Watch PAT device. Watch PAT device has been registered and shipped via [employer] on 11/22/2023. Patient was notified that package was mailed out. Watch PAT serial number: [id number]    Tracking # [id number].

## 2023-11-30 ENCOUNTER — DOCUMENTATION ONLY (OUTPATIENT)
Dept: SLEEP MEDICINE | Facility: HOSPITAL | Age: 48
End: 2023-11-30
Attending: FAMILY MEDICINE
Payer: COMMERCIAL

## 2023-11-30 DIAGNOSIS — G47.00 INSOMNIA WITH SLEEP APNEA: ICD-10-CM

## 2023-11-30 DIAGNOSIS — R06.83 SNORING: ICD-10-CM

## 2023-11-30 DIAGNOSIS — G47.30 INSOMNIA WITH SLEEP APNEA: ICD-10-CM

## 2023-11-30 PROCEDURE — 95800 SLP STDY UNATTENDED: CPT

## 2023-11-30 PROCEDURE — 95800 SLP STDY UNATTENDED: CPT | Mod: 26 | Performed by: FAMILY MEDICINE

## 2023-11-30 NOTE — PROGRESS NOTES
WatchPAT data has been received and has been scored using rule 1B, 4%. Patient to follow up with provider to determine appropriate therapy.    Pat AHI: 94.1    Ordering Provider: Dr Bladimir Hurst      Sleep Technician/Technologist: Ene HANCOCK

## 2023-12-03 NOTE — PROCEDURES
"WatchPAT - HOME SLEEP STUDY INTERPRETATION    Patient: Pan Marroquin  MRN: [id number]  YOB: 1975  Study Date: 2023  Referring Provider: Karel Gonzalez  Ordering Provider: Bladimir Hurst MD, MD    Chain of custody patient verification was not enabled.       Indications for Home Study: Pan Marroquin is a 48 year old male with a history of RUSSELL, obesity, GERD  who presents with history of RUSSELL with interim weight gain.    Prior Sleep Testin2015 - PSG with weight 310 lbs.  AHI 21.1.  Minimal sleep observed on CPAP.    Estimated body mass index is 42.47 kg/m  as calculated from the following:    Height as of 22: 1.88 m (6' 2\").    Weight as of 23: 150 kg (330 lb 12.8 oz).  Total score - [place]: 11 (2023  6:34 PM)  Total Score: 7 (2023  6:34 PM)    Data: A full night home sleep study was performed recording the standard physiologic parameters including peripheral arterial tonometry (PAT), sound/snoring, body position,  movement, sound, and oxygen saturation by pulse oximetry. Pulse rate was estimated by oximetry recording. Sleep staging (wake, REM, light, and deep sleep) was derived from PAT signal.  This study was considered adequate based on > 4 hours of quality oximetry and respiratory recording. As specified by the AASM Manual for the Scoring of Sleep and Associated events, version 2.3, Rule VIII.D 1B, 4% oxygen desaturation scoring for hypopneas is used as a standard of care on all home sleep apnea testing.    Total Recording Time: 7 hrs, 21 min  Total Sleep Time: 6 hrs, 54 min  % of Sleep Time REM: 7.7%    Respiratory:  Snoring: Snoring was present.  Respiratory events: The PAT respiratory disturbance index [pRDI] was 94.3 events per hour.  The PAT apnea/hypopnea index [pAHI] was 94.1 events per hour.  MICHELLE was 81.4 events per hour.  During REM sleep the pAHI was 77.3.  Sleep Associated Hypoxemia: sustained hypoxemia was not present. Mean oxygen " saturation was 91%.  Minimum was 75%.  Time with saturation less than 88% was 83.4 minutes.    Heart Rate: By pulse oximetry normal rate was noted on average, but was irregular.     Position: Percent of time spent: supine - 38%, prone - 1.7%, on right - 19.1%, on left - 41.3%.  pAHI was 96.3 per hour supine, - per hour prone, 87.1 per hour on right side, and 95.4 per hour on left side.     Assessment:   Severe obstructive sleep apnea.  Sleep associated hypoxemia was present.    Recommendations:  Consider auto-CPAP at 5-15 cmH2O or polysomnography with full night PAP titration.  Suggest optimizing sleep hygiene and avoiding sleep deprivation.  Weight management.    Diagnosis Code(s): Obstructive Sleep Apnea G47.33, Hypoxemia G47.36    Bladimir Hurst MD, MD, December 2, 2023   Diplomate, American Board of Family Medicine, Sleep Medicine

## 2023-12-07 DIAGNOSIS — K21.00 GASTROESOPHAGEAL REFLUX DISEASE WITH ESOPHAGITIS, UNSPECIFIED WHETHER HEMORRHAGE: ICD-10-CM

## 2023-12-07 RX ORDER — ESOMEPRAZOLE MAGNESIUM 40 MG
CAPSULE,DELAYED RELEASE (ENTERIC COATED) ORAL
Qty: 90 CAPSULE | Refills: 0 | Status: SHIPPED | OUTPATIENT
Start: 2023-12-07 | End: 2024-03-07

## 2023-12-07 NOTE — TELEPHONE ENCOUNTER
NEXIUM 40 MG DR capsule         Last Written Prescription Date:  7/28/23  Last Fill Quantity: 90,   # refills: 1  Last Office Visit: 2/27/23  Future Office visit:       Routing refill request to provider for review/approval because:

## 2023-12-07 NOTE — TELEPHONE ENCOUNTER
Reason for call:  Medication      Have you contacted your pharmacy? No   If patient has contacted Pharmacy and it has been over 72hrs, continue to #2  Medication Nexium 40 mg  What Pharmacy do you use? [facility] in [place]      (Please note that the turn-around-time for prescriptions is 72 business hours; I am sending your request at this time. SEND TO  [facility]  )

## 2023-12-08 ENCOUNTER — TELEPHONE (OUTPATIENT)
Dept: INTERNAL MEDICINE | Facility: OTHER | Age: 48
End: 2023-12-08

## 2023-12-08 DIAGNOSIS — K21.00 GASTROESOPHAGEAL REFLUX DISEASE WITH ESOPHAGITIS, UNSPECIFIED WHETHER HEMORRHAGE: Primary | ICD-10-CM

## 2023-12-08 NOTE — TELEPHONE ENCOUNTER
Received a PA request from Jose for NEXIUM 40 MG DR capsule. Submitted on CMM. Waiting for a response.

## 2023-12-10 NOTE — PROGRESS NOTES
"Virtual Visit Details    Type of service:  Video Visit     Pan Marroquin is a 48 year old male who is being evaluated via a billable video visit.       The patient has been notified of following:      \"This video visit will be conducted via a call between you and your physician/provider. We have found that certain health care needs can be provided without the need for an in-person physical exam.  This service lets us provide the care you need with a video conversation.  If a prescription is necessary we can send it directly to your pharmacy.  If lab work is needed we can place an order for that and you can then stop by our lab to have the test done at a later time.     Video visits are billed at different rates depending on your insurance coverage.  Please reach out to your insurance provider with any questions.     If during the course of the call the physician/provider feels a video visit is not appropriate, you will not be charged for this service.\"     Patient has given verbal consent for Video visit? Yes  How would you like to obtain your AVS? Mail a copy  If you are dropped from the video visit, the video invite should be resent to: Text to cell phone: -  Will anyone else be joining your video visit? No  If patient encounters technical issues they should call [phone]      Video-Visit Details     Type of service:  Video Visit     Start Time:  1:35pm  End Time: 1:58 PM    Originating Location (pt. Location): Home     Distant Location (provider location):  Off-site, [facility]       Platform used for Video Visit: CompareAway    Virtual visit for review of home sleep testing results.     A/P:     1.) Severe RUSSELL (pAHI 94.1) with significant sleep-associated hypoxemia (SpO2 <= 88% for 83.4 minutes).    We discussed his history of significant CPAP intolerance.  Given the severity of his obstructive sleep apnea and significant previous CPAP intolerance, I feel that bilevel PAP spontaneous is " "an appropriate choice with likely significant health benefit and presumed significant daytime benefit.    We discussed options of all-night Pap titration sleep study versus start of auto titrate bilevel PAP and spontaneous mode on room air.  Due to his work schedule, scheduling a sleep study likely would be challenging, so I did agree to start with bilevel PAP auto titrate with min EPAP 5, max IPAP 15, PS 5 and spontaneous mode on room air.    Plan for follow-up 4 weeks after starting bilevel.    SUBJECTIVE:  Pan Marroquin is a 48 year old male.    48 year old male who is referred for RUSSELL.     Pertinent PMHx of RUSSELL, obesity, GERD.     Prior Sleep Testin2015 - PSG with weight 310 lbs.  AHI 21.1.  Minimal sleep observed on CPAP.     STOP-BANG score of 7, with unknown neck circumference.  [place] score of 11.  DAKSHA: 21     BMI of Estimated body mass index is 42.47 kg/m  as calculated from the following:    Height as of 22: 1.88 m (6' 2\").    Weight as of 23: 150 kg (330 lb 12.8 oz).      Today -we reviewed his sleep history more detail.  His current concerns include loud snoring, observed apnea, frequent nighttime awakenings and significant daytime fatigue.    He did try CPAP following his sleep study in , but he found it very difficult to tolerate with a feeling of excessive pressure and collapse in his posterior oropharynx and this led to prompt discontinuation.  He did try multiple different mask interfaces.    Chief concern per questionnaire: \"Problems with sleep \"     Goals for visit per questionnaire: \"Be able to manage my sleep apnea \"     Caffeine use:  No for 3+ per day.  No for within 6 hours of bed.     Sleep pattern:  Workdays.  8pm - 4:30am.  Weekends.  10pm - 8am.  Time to fall asleep: ~30 minutes.  Awakenings: 4-5 times per night, short minutes to return to sleep.  Average total sleep time:  6 hours  Napping.  2 days per week, 0.75 hours per nap.     No for RLS screen.  No for sleep " "walking.  No for dream enactment behavior.  No for bruxism.     Yes for morning headaches.  Yes for snoring.  Yes for observed apnea.  Yes for FHx of RUSSELL.    WatchPAT - HOME SLEEP STUDY INTERPRETATION     Patient: Pan Marroquin  MRN: [id number]  YOB: 1975  Study Date: 2023  Referring Provider: Karel Gonzalez  Ordering Provider: Bladimir Hurst MD, MD     Chain of custody patient verification was not enabled.       Indications for Home Study: Pan Marroquin is a 48 year old male with a history of RUSSELL, obesity, GERD  who presents with history of RUSSELL with interim weight gain.     Prior Sleep Testin2015 - PSG with weight 310 lbs.  AHI 21.1.  Minimal sleep observed on CPAP.     Estimated body mass index is 42.47 kg/m  as calculated from the following:    Height as of 22: 1.88 m (6' 2\").    Weight as of 23: 150 kg (330 lb 12.8 oz).  Total score - [place]: 11 (2023  6:34 PM)  Total Score: 7 (2023  6:34 PM)     Data: A full night home sleep study was performed recording the standard physiologic parameters including peripheral arterial tonometry (PAT), sound/snoring, body position,  movement, sound, and oxygen saturation by pulse oximetry. Pulse rate was estimated by oximetry recording. Sleep staging (wake, REM, light, and deep sleep) was derived from PAT signal.  This study was considered adequate based on > 4 hours of quality oximetry and respiratory recording. As specified by the AASM Manual for the Scoring of Sleep and Associated events, version 2.3, Rule VIII.D 1B, 4% oxygen desaturation scoring for hypopneas is used as a standard of care on all home sleep apnea testing.     Total Recording Time: 7 hrs, 21 min  Total Sleep Time: 6 hrs, 54 min  % of Sleep Time REM: 7.7%     Respiratory:  Snoring: Snoring was present.  Respiratory events: The PAT respiratory disturbance index [pRDI] was 94.3 events per hour.  The PAT apnea/hypopnea index [pAHI] was 94.1 events " per hour.  MICHELLE was 81.4 events per hour.  During REM sleep the pAHI was 77.3.  Sleep Associated Hypoxemia: sustained hypoxemia was not present. Mean oxygen saturation was 91%.  Minimum was 75%.  Time with saturation less than 88% was 83.4 minutes.     Heart Rate: By pulse oximetry normal rate was noted on average, but was irregular.      Position: Percent of time spent: supine - 38%, prone - 1.7%, on right - 19.1%, on left - 41.3%.  pAHI was 96.3 per hour supine, - per hour prone, 87.1 per hour on right side, and 95.4 per hour on left side.      Assessment:   Severe obstructive sleep apnea.  Sleep associated hypoxemia was present.     Recommendations:  Consider auto-CPAP at 5-15 cmH2O or polysomnography with full night PAP titration.  Suggest optimizing sleep hygiene and avoiding sleep deprivation.  Weight management.     Diagnosis Code(s): Obstructive Sleep Apnea G47.33, Hypoxemia G47.36     Bladimir Hurst MD, MD, December 2, 2023   Diplomate, American Board of Family Medicine, Sleep Medicine    Past medical history:    Patient Active Problem List    Diagnosis Date Noted    Obesity (BMI 35.0-39.9) with comorbidity (H) 01/07/2019     Priority: Medium    ACP (advance care planning) 06/06/2016     Priority: Medium     Advance Care Planning 6/6/2016: ACP Review of Chart / Resources Provided:  Reviewed chart for advance care plan.  Pan Marroquin has no plan or code status on file. Discussed available resources and provided with information. Confirmed code status reflects current choices pending further ACP discussions.  Confirmed/documented legally designated decision makers.  Added by Kalpana Faith 06/06/2016     Priority: Medium    RUSSELL (obstructive sleep apnea) 12/08/2015     Priority: Medium    Obesity 09/12/2011     Priority: Medium    GERD (gastroesophageal reflux disease) 04/20/2005     Priority: Medium       10 point ROS of systems including Constitutional, Eyes, Respiratory,  Cardiovascular, Gastroenterology, Genitourinary, Integumentary, Muscularskeletal, Psychiatric were all negative except for pertinent positives noted in my HPI.    Current Outpatient Medications   Medication Sig Dispense Refill    amLODIPine (NORVASC) 10 MG tablet TAKE 1 TABLET DAILY 90 tablet 3    mometasone (ELOCON) 0.1 % external cream APPLY SPARINGLY TO AFFECTED AREA TWICE A DAY AS NEEDED. DO NOT APPLY TO FACE 45 g 1    NEXIUM 40 MG DR capsule TAKE 1 CAPSULE EVERY MORNING BEFORE BREAKFAST 30 TO 60 MINUTES BEFORE EATING 90 capsule 0       OBJECTIVE:  There were no vitals taken for this visit.    Physical Exam     ---  This note was written with the assistance of the Dragon voice-dictation technology software. The final document, although reviewed, may contain errors. For corrections, please contact the office.    Total time spent preparing to see the patient, review of chart, obtaining history and physical examination, review of sleep testing, review of treatment options, education, discussion with patient and documenting in Epic / EMR was 25 minutes.  All time involved was spent on the day of service for the patient (the same day as the patient's appointment).    Bladimir Hurst MD    Sleep Medicine  [place], [place]  Main Office: [phone]  [facility] [place], [place]  [address], [address]  Schedule visits: [phone]  Main Office: [phone]  Fax: [phone]

## 2023-12-12 ENCOUNTER — VIRTUAL VISIT (OUTPATIENT)
Dept: PULMONOLOGY | Facility: OTHER | Age: 48
End: 2023-12-12
Attending: FAMILY MEDICINE
Payer: COMMERCIAL

## 2023-12-12 VITALS — HEIGHT: 74 IN | BODY MASS INDEX: 38.5 KG/M2 | WEIGHT: 300 LBS

## 2023-12-12 DIAGNOSIS — Z78.9 INTOLERANCE OF CONTINUOUS POSITIVE AIRWAY PRESSURE (CPAP) VENTILATION: ICD-10-CM

## 2023-12-12 DIAGNOSIS — G47.33 OSA (OBSTRUCTIVE SLEEP APNEA): Primary | ICD-10-CM

## 2023-12-12 PROCEDURE — 99214 OFFICE O/P EST MOD 30 MIN: CPT | Mod: 95 | Performed by: FAMILY MEDICINE

## 2023-12-12 ASSESSMENT — PAIN SCALES - GENERAL: PAINLEVEL: SEVERE PAIN (7)

## 2023-12-12 NOTE — NURSING NOTE
Is the patient currently in the state of [place]? YES    Visit mode:VIDEO    If the visit is dropped, the patient can be reconnected by: VIDEO VISIT: Text to cell phone:   Telephone Information:   Mobile [phone]       Will anyone else be joining the visit? NO  (If patient encounters technical issues they should call [phone]    How would you like to obtain your AVS? MyChart    Are changes needed to the allergy or medication list? Pt stated no changes to allergies and Pt stated no med changes    Reason for visit: RECHECK  Has patient had flu shot for current/most recent flu season? If so, when? No    Parul DANIELS

## 2023-12-12 NOTE — TELEPHONE ENCOUNTER
Received a fax from [employer] needing more info for PA on NEXIUM 40 MG DR capsule. Filled out and faxed back.

## 2023-12-14 ENCOUNTER — DOCUMENTATION ONLY (OUTPATIENT)
Dept: HOME HEALTH SERVICES | Facility: CLINIC | Age: 48
End: 2023-12-14

## 2023-12-14 NOTE — PROGRESS NOTES
Patient was offered choice of vendor and chose [facility].  Patient Pan Marroquin was set up at [facility] on December 14, 2023. Patient received a Resmed Aircurve 10 Pressures were set at  EPAP Min 5; IPAP Max 15; PS 5 cm H2O.   Patient s ramp is Off.  Patient received a Resmed Mask name: Airfit [id number]  Nasal mask size Medium, heated tubing and heated humidifier.  Patient has the following compliance requirements: using and visit requirements  Patient has a follow up on 01/23/2024 with Dr. Hurst.    Severino Gonsales

## 2023-12-19 NOTE — TELEPHONE ENCOUNTER
Call placed to patient to provide an update Nexium has been denied by Insurance.     No other options available per Dr. Gonzalez.     Karel Gonzalez, Susana Damon RN  Caller: Unspecified (1 week ago)  No other options given    No answer, message left requesting a return call.

## 2023-12-19 NOTE — TELEPHONE ENCOUNTER
Pt would like the either a prescription for Nexium name brand or generic with the 20mg 2 times daily as they don't last as long as the Nexium. Please call pt. [phone]

## 2023-12-19 NOTE — TELEPHONE ENCOUNTER
Call returned to patient. Patient reports Insurance pays for generic, however always denies Nexium.     Patient requesting a prescription for esomeprazole 20 mg twice daily.     Request sent to Dr. Gonzalez.

## 2023-12-20 DIAGNOSIS — K21.00 GASTROESOPHAGEAL REFLUX DISEASE WITH ESOPHAGITIS WITHOUT HEMORRHAGE: Primary | ICD-10-CM

## 2023-12-26 DIAGNOSIS — K21.00 GASTROESOPHAGEAL REFLUX DISEASE WITH ESOPHAGITIS WITHOUT HEMORRHAGE: ICD-10-CM

## 2023-12-26 NOTE — TELEPHONE ENCOUNTER
Patient called and left a message stating that generic Nexium (esomeprazole) was sent to pharmacy but was only filled for one time daily.  Per previous note and noted in message, patient reports that esomeprazole does not last as long as brand Nexium and patient requesting for 20mg twice daily.    Order pended

## 2024-01-04 ENCOUNTER — OFFICE VISIT (OUTPATIENT)
Dept: INTERNAL MEDICINE | Facility: OTHER | Age: 49
End: 2024-01-04
Attending: INTERNAL MEDICINE
Payer: COMMERCIAL

## 2024-01-04 VITALS
SYSTOLIC BLOOD PRESSURE: 134 MMHG | WEIGHT: 315 LBS | DIASTOLIC BLOOD PRESSURE: 88 MMHG | OXYGEN SATURATION: 95 % | HEART RATE: 81 BPM | BODY MASS INDEX: 42.37 KG/M2 | TEMPERATURE: 98.6 F | RESPIRATION RATE: 20 BRPM

## 2024-01-04 DIAGNOSIS — Z12.5 SCREENING FOR PROSTATE CANCER: ICD-10-CM

## 2024-01-04 DIAGNOSIS — Z23 NEED FOR INFLUENZA VACCINATION: Primary | ICD-10-CM

## 2024-01-04 DIAGNOSIS — Z00.00 ROUTINE HISTORY AND PHYSICAL EXAMINATION OF ADULT: ICD-10-CM

## 2024-01-04 DIAGNOSIS — Z23 NEED FOR TD VACCINE: ICD-10-CM

## 2024-01-04 DIAGNOSIS — Z01.00 EXAMINATION OF EYES AND VISION: ICD-10-CM

## 2024-01-04 DIAGNOSIS — Z12.11 SPECIAL SCREENING FOR MALIGNANT NEOPLASMS, COLON: ICD-10-CM

## 2024-01-04 PROCEDURE — 99214 OFFICE O/P EST MOD 30 MIN: CPT | Performed by: INTERNAL MEDICINE

## 2024-01-04 ASSESSMENT — PAIN SCALES - GENERAL: PAINLEVEL: EXTREME PAIN (8)

## 2024-01-05 ENCOUNTER — TELEPHONE (OUTPATIENT)
Dept: INTERNAL MEDICINE | Facility: OTHER | Age: 49
End: 2024-01-05

## 2024-01-05 ENCOUNTER — HOSPITAL ENCOUNTER (OUTPATIENT)
Facility: HOSPITAL | Age: 49
End: 2024-01-05
Attending: SURGERY | Admitting: SURGERY
Payer: COMMERCIAL

## 2024-01-05 DIAGNOSIS — Z12.11 ENCOUNTER FOR SCREENING COLONOSCOPY: Primary | ICD-10-CM

## 2024-01-05 RX ORDER — BISACODYL 5 MG/1
10 TABLET, DELAYED RELEASE ORAL ONCE
Qty: 2 TABLET | Refills: 0 | Status: SHIPPED | OUTPATIENT
Start: 2024-01-05 | End: 2024-01-05

## 2024-01-05 NOTE — TELEPHONE ENCOUNTER
Patient scheduled screening colonoscopy 03/22/24 with david Acharya bowel prep ordered and instructions mailed today.

## 2024-01-05 NOTE — TELEPHONE ENCOUNTER
Screening Questions for the Scheduling of Screening Colonoscopies     (If Colonoscopy is diagnostic, Provider should review the chart before scheduling.)    Are you younger than 50 or older than 80?  Yes, 48 year old    Do you take aspirin or fish oil?  No (if yes, tell patient to stop 1 week prior to Colonoscopy)    Do you take warfarin (Coumadin), clopidogrel (Plavix), apixaban (Eliquis), dabigatram (Pradaxa), rivaroxaban (Xarelto) or any blood thinner? No    Do you use oxygen at home?  No    Do you have kidney disease? No    Are you on dialysis? No    Have you had a stroke or heart attack in the last year? No    Have you had a stent in your heart or any blood vessel in the last year? No    Have you had a transplant of any organ?  No    Have you had a colonoscopy or upper endoscopy (EGD) before?  No         Date of scheduled Colonoscopy: 3/22/24     Provider: Dr. Wyman     [facility]

## 2024-01-08 ENCOUNTER — LAB (OUTPATIENT)
Dept: LAB | Facility: OTHER | Age: 49
End: 2024-01-08
Payer: COMMERCIAL

## 2024-01-08 DIAGNOSIS — Z00.00 ROUTINE HISTORY AND PHYSICAL EXAMINATION OF ADULT: ICD-10-CM

## 2024-01-08 DIAGNOSIS — Z12.5 SCREENING FOR PROSTATE CANCER: ICD-10-CM

## 2024-01-08 LAB
ALBUMIN SERPL BCG-MCNC: 4.3 G/DL (ref 3.5–5.2)
ALP SERPL-CCNC: 87 U/L (ref 40–150)
ALT SERPL W P-5'-P-CCNC: 93 U/L (ref 0–70)
ANION GAP SERPL CALCULATED.3IONS-SCNC: 12 MMOL/L (ref 7–15)
AST SERPL W P-5'-P-CCNC: 61 U/L (ref 0–45)
BASOPHILS # BLD AUTO: 0 10E3/UL (ref 0–0.2)
BASOPHILS NFR BLD AUTO: 0 %
BILIRUB SERPL-MCNC: 0.7 MG/DL
BUN SERPL-MCNC: 18.3 MG/DL (ref 6–20)
CALCIUM SERPL-MCNC: 9.4 MG/DL (ref 8.6–10)
CHLORIDE SERPL-SCNC: 101 MMOL/L (ref 98–107)
CHOLEST SERPL-MCNC: 144 MG/DL
CREAT SERPL-MCNC: 1.45 MG/DL (ref 0.67–1.17)
DEPRECATED HCO3 PLAS-SCNC: 26 MMOL/L (ref 22–29)
EGFRCR SERPLBLD CKD-EPI 2021: 59 ML/MIN/1.73M2
EOSINOPHIL # BLD AUTO: 0.4 10E3/UL (ref 0–0.7)
EOSINOPHIL NFR BLD AUTO: 4 %
ERYTHROCYTE [DISTWIDTH] IN BLOOD BY AUTOMATED COUNT: 13.1 % (ref 10–15)
FASTING STATUS PATIENT QL REPORTED: YES
GLUCOSE SERPL-MCNC: 103 MG/DL (ref 70–99)
HCT VFR BLD AUTO: 46.7 % (ref 40–53)
HDLC SERPL-MCNC: 31 MG/DL
HGB BLD-MCNC: 16.5 G/DL (ref 13.3–17.7)
IMM GRANULOCYTES # BLD: 0 10E3/UL
IMM GRANULOCYTES NFR BLD: 0 %
LDLC SERPL CALC-MCNC: 88 MG/DL
LYMPHOCYTES # BLD AUTO: 2.6 10E3/UL (ref 0.8–5.3)
LYMPHOCYTES NFR BLD AUTO: 27 %
MCH RBC QN AUTO: 28.8 PG (ref 26.5–33)
MCHC RBC AUTO-ENTMCNC: 35.3 G/DL (ref 31.5–36.5)
MCV RBC AUTO: 82 FL (ref 78–100)
MONOCYTES # BLD AUTO: 0.6 10E3/UL (ref 0–1.3)
MONOCYTES NFR BLD AUTO: 7 %
NEUTROPHILS # BLD AUTO: 5.8 10E3/UL (ref 1.6–8.3)
NEUTROPHILS NFR BLD AUTO: 61 %
NONHDLC SERPL-MCNC: 113 MG/DL
PLATELET # BLD AUTO: 239 10E3/UL (ref 150–450)
POTASSIUM SERPL-SCNC: 4.3 MMOL/L (ref 3.4–5.3)
PROT SERPL-MCNC: 7.7 G/DL (ref 6.4–8.3)
PSA SERPL DL<=0.01 NG/ML-MCNC: 0.61 NG/ML (ref 0–2.5)
RBC # BLD AUTO: 5.73 10E6/UL (ref 4.4–5.9)
SODIUM SERPL-SCNC: 139 MMOL/L (ref 135–145)
TRIGL SERPL-MCNC: 127 MG/DL
WBC # BLD AUTO: 9.4 10E3/UL (ref 4–11)

## 2024-01-08 PROCEDURE — 80061 LIPID PANEL: CPT

## 2024-01-08 PROCEDURE — 85025 COMPLETE CBC W/AUTO DIFF WBC: CPT

## 2024-01-08 PROCEDURE — 36415 COLL VENOUS BLD VENIPUNCTURE: CPT

## 2024-01-08 PROCEDURE — 80053 COMPREHEN METABOLIC PANEL: CPT

## 2024-01-08 PROCEDURE — G0103 PSA SCREENING: HCPCS

## 2024-01-09 DIAGNOSIS — G47.33 OSA (OBSTRUCTIVE SLEEP APNEA): Primary | ICD-10-CM

## 2024-01-09 DIAGNOSIS — Z78.9 INTOLERANCE OF CONTINUOUS POSITIVE AIRWAY PRESSURE (CPAP) VENTILATION: ICD-10-CM

## 2024-01-09 NOTE — PROGRESS NOTES
Pan said he is having a hard time with his bipap and is wondering if we could lower the pressures and slowly go back up to his Ipap 15- Epap 5    Current settings bilevel PAP auto with min EPAP 5, max IPAP 15, PS 5.    Will try adjusting to min EPAP 4, max IPAP 10, PS 4.

## 2024-01-10 ENCOUNTER — TELEPHONE (OUTPATIENT)
Dept: FAMILY MEDICINE | Facility: OTHER | Age: 49
End: 2024-01-10

## 2024-01-10 NOTE — TELEPHONE ENCOUNTER
Karel Gonzalez, Susana Damon, RN  Please send to [employer] for letter and reknewal of this medication    LOV: Dr. Gonzalez on 1/4/24    He states that he has been on Nexium brand-name for years and has not tolerated other medications for his GERD.

## 2024-01-22 NOTE — PROGRESS NOTES
Pan Marroquin is a 48 year old male who is being evaluated via a billable telephone visit.       What phone number would you like to be contacted at?PHONE@ [phone]  Home Phone [phone]   Work Phone Not on file.   Mobile [phone]       How would you like to obtain your AVS? ponUp       Telephone Virtual Visit Details     Type of service:  Telephone Virtual Visit     Originating Location (pt. Location): Home     Distant Location (provider location):  Off-site, [facility]      Start Time:  3:30pm  End Time:  3:50pm    Virtual visit for bilevel PAP compliance visit.     A/P:     1.) Severe RUSSELL (pAHI 94.1) with significant sleep-associated hypoxemia (SpO2 <= 88% for 83.4 minutes).     We discussed his history of significant CPAP intolerance.  Given the severity of his obstructive sleep apnea and significant previous CPAP intolerance, I feel that bilevel PAP spontaneous is an appropriate choice with likely significant health benefit and presumed significant daytime benefit.    Overall, he also feels he has been unable to tolerate bilevel PAP with settings attempted a minimum EPAP of 4 and 5, maximum IPAP 10 and 15, pressure support 4 and 5.    He feels that he has had improvement with use of a self fit mandibular management device, so he did agree to proceed with repeat WatchPAT home sleep test with use of oral appliance to evaluate effectiveness.  If there has not been significant improvements, I would recommend proceeding with all-night Pap titration sleep study.  If weight loss is successful to BMI less than 40, could consider upper airway stimulation with repeat diagnostic PSG.    Recommended Sleep Testing/Procedures:    WatchPat      SUBJECTIVE:  Pan Marroquin is a 48 year old male.    Pertinent PMHx of RUSSELL, obesity, GERD.     Prior Sleep Testin2015 - PSG with weight 310 lbs.  AHI 21.1.  Minimal sleep observed on CPAP.  2023 - WatchPAT HST with weight 330 lbs,  "BMI 42.5.  pAHI 94.1.  sustained hypoxemia was not present. Mean oxygen saturation was 91%.  Minimum was 75%.  Time with saturation less than 88% was 83.4 minutes.     12/12/2023 -we reviewed his sleep history more detail.  His current concerns include loud snoring, observed apnea, frequent nighttime awakenings and significant daytime fatigue.     He did try CPAP following his sleep study in 2015, but he found it very difficult to tolerate with a feeling of excessive pressure and collapse in his posterior oropharynx and this led to prompt discontinuation.  He did try multiple different mask interfaces.     Chief concern per questionnaire: \"Problems with sleep \"     Goals for visit per questionnaire: \"Be able to manage my sleep apnea \"     Caffeine use:  No for 3+ per day.  No for within 6 hours of bed.     Sleep pattern:  Workdays.  8pm - 4:30am.  Weekends.  10pm - 8am.  Time to fall asleep: ~30 minutes.  Awakenings: 4-5 times per night, short minutes to return to sleep.  Average total sleep time:  6 hours  Napping.  2 days per week, 0.75 hours per nap.     No for RLS screen.  No for sleep walking.  No for dream enactment behavior.  No for bruxism.     Yes for morning headaches.  Yes for snoring.  Yes for observed apnea.  Yes for FHx of RUSSELL.    A/P for all-night PAP titration PSG versus start of empiric bilevel PAP auto-titrate on room air given history of significant CPAP intolerance.  Given his work schedule, he wanted to start treatment.  Orders for bilevel PAP auto-titrate with min EPAP 5, max IPAP 15, PS 5 spontaneous mode on room air with follow-up LISA on treatment if AHI < 10.    Today -he reports that he has largely been unable to tolerate the bilevel even following the pressure reduction with current minimum EPAP 4, maximum IPAP 10, pressures were 4.  He feels that it is hard to catch his breath.  Given these frustrations, he did start using a self fit mandibular advancement device and feels that it has " been quite effective with significant reduction in awakenings and feeling less tired.    Bilevel PAP download showing no usage.    Past medical history:    Patient Active Problem List    Diagnosis Date Noted    Obesity (BMI 35.0-39.9) with comorbidity (H) 01/07/2019     Priority: Medium    ACP (advance care planning) 06/06/2016     Priority: Medium     Advance Care Planning 6/6/2016: ACP Review of Chart / Resources Provided:  Reviewed chart for advance care plan.  Pna Marroquin has no plan or code status on file. Discussed available resources and provided with information. Confirmed code status reflects current choices pending further ACP discussions.  Confirmed/documented legally designated decision makers.  Added by Kalpana Cohen            Eczema 06/06/2016     Priority: Medium    RUSSELL (obstructive sleep apnea) 12/08/2015     Priority: Medium    Obesity 09/12/2011     Priority: Medium    GERD (gastroesophageal reflux disease) 04/20/2005     Priority: Medium       10 point ROS of systems including Constitutional, Eyes, Respiratory, Cardiovascular, Gastroenterology, Genitourinary, Integumentary, Muscularskeletal, Psychiatric were all negative except for pertinent positives noted in my HPI.    Current Outpatient Medications   Medication Sig Dispense Refill    amLODIPine (NORVASC) 10 MG tablet TAKE 1 TABLET DAILY 90 tablet 3    mometasone (ELOCON) 0.1 % external cream APPLY SPARINGLY TO AFFECTED AREA TWICE A DAY AS NEEDED. DO NOT APPLY TO FACE 45 g 1    NEXIUM 40 MG DR capsule TAKE 1 CAPSULE EVERY MORNING BEFORE BREAKFAST 30 TO 60 MINUTES BEFORE EATING 90 capsule 0       OBJECTIVE:  There were no vitals taken for this visit.    Physical Exam:  healthy, alert, and no distress  PSYCH: Alert and oriented times 3; coherent speech, normal   rate and volume, able to articulate logical thoughts, able   to abstract reason, no tangential thoughts, no hallucinations   or delusions  His affect is normal  RESP: No cough, no  audible wheezing, able to talk in full sentences  Remainder of exam unable to be completed due to telephone visits    ---  This note was written with the assistance of the Dragon voice-dictation technology software. The final document, although reviewed, may contain errors. For corrections, please contact the office.    Total time spent in conversation with patient on the phone today was 25 minutes.    Bladimir Hurst MD    Sleep Medicine  [place], [place]  Main Office: [phone]  [facility] [address], [address]  Schedule visits: [phone]  Main Office: [phone]  Fax: [phone]

## 2024-01-23 ENCOUNTER — VIRTUAL VISIT (OUTPATIENT)
Dept: PULMONOLOGY | Facility: OTHER | Age: 49
End: 2024-01-23
Attending: FAMILY MEDICINE
Payer: COMMERCIAL

## 2024-01-23 VITALS — BODY MASS INDEX: 40.43 KG/M2 | WEIGHT: 315 LBS | HEIGHT: 74 IN

## 2024-01-23 DIAGNOSIS — G47.33 OSA (OBSTRUCTIVE SLEEP APNEA): Primary | ICD-10-CM

## 2024-01-23 DIAGNOSIS — Z78.9 INTOLERANCE OF CONTINUOUS POSITIVE AIRWAY PRESSURE (CPAP) VENTILATION: ICD-10-CM

## 2024-01-23 PROCEDURE — 99442 PR PHYSICIAN TELEPHONE EVALUATION 11-20 MIN: CPT | Mod: 93 | Performed by: FAMILY MEDICINE

## 2024-01-23 ASSESSMENT — PAIN SCALES - GENERAL: PAINLEVEL: NO PAIN (0)

## 2024-01-23 NOTE — NURSING NOTE
Is the patient currently in the state of [place]? YES    Visit mode:TELEPHONE    If the visit is dropped, the patient can be reconnected by: TELEPHONE VISIT: Phone number:   Telephone Information:   Mobile [phone]       Will anyone else be joining the visit? NO  (If patient encounters technical issues they should call [phone]    How would you like to obtain your AVS? MyChart    Are changes needed to the allergy or medication list? Pt stated no med changes    Reason for visit: RECHECK    Jasmina Mclaughlin VVF  Has patient had flu shot for current/most recent flu season? If so, when? No

## 2024-02-07 NOTE — PROGRESS NOTES
Patient was provided both verbal and written education and instructions on use of Watch PAT device. Watch PAT device has been registered and shipped via [employer] on 2/7/2024. Patient was notified that package was mailed out. Watch Inspire[employer] serial number: [id number].    Tracking  # [id number]

## 2024-02-08 ENCOUNTER — VIRTUAL VISIT (OUTPATIENT)
Dept: SLEEP MEDICINE | Facility: HOSPITAL | Age: 49
End: 2024-02-08
Attending: FAMILY MEDICINE
Payer: COMMERCIAL

## 2024-02-08 DIAGNOSIS — R06.83 SNORING: ICD-10-CM

## 2024-02-08 DIAGNOSIS — G47.00 INSOMNIA WITH SLEEP APNEA: Primary | ICD-10-CM

## 2024-02-08 DIAGNOSIS — G47.30 INSOMNIA WITH SLEEP APNEA: Primary | ICD-10-CM

## 2024-02-18 ENCOUNTER — HEALTH MAINTENANCE LETTER (OUTPATIENT)
Age: 49
End: 2024-02-18

## 2024-02-20 ENCOUNTER — TELEPHONE (OUTPATIENT)
Dept: INTERNAL MEDICINE | Facility: OTHER | Age: 49
End: 2024-02-20

## 2024-02-20 ENCOUNTER — DOCUMENTATION ONLY (OUTPATIENT)
Dept: SLEEP MEDICINE | Facility: HOSPITAL | Age: 49
End: 2024-02-20
Attending: FAMILY MEDICINE
Payer: COMMERCIAL

## 2024-02-20 ENCOUNTER — LAB (OUTPATIENT)
Dept: LAB | Facility: OTHER | Age: 49
End: 2024-02-20
Attending: FAMILY MEDICINE
Payer: COMMERCIAL

## 2024-02-20 DIAGNOSIS — R68.82 DECREASED LIBIDO: ICD-10-CM

## 2024-02-20 DIAGNOSIS — G47.33 OSA (OBSTRUCTIVE SLEEP APNEA): ICD-10-CM

## 2024-02-20 DIAGNOSIS — R68.82 DECREASED LIBIDO: Primary | ICD-10-CM

## 2024-02-20 DIAGNOSIS — Z78.9 INTOLERANCE OF CONTINUOUS POSITIVE AIRWAY PRESSURE (CPAP) VENTILATION: ICD-10-CM

## 2024-02-20 LAB
ALBUMIN SERPL BCG-MCNC: 4.3 G/DL (ref 3.5–5.2)
ALP SERPL-CCNC: 102 U/L (ref 40–150)
ALT SERPL W P-5'-P-CCNC: 112 U/L (ref 0–70)
ANION GAP SERPL CALCULATED.3IONS-SCNC: 13 MMOL/L (ref 7–15)
AST SERPL W P-5'-P-CCNC: 74 U/L (ref 0–45)
BILIRUB SERPL-MCNC: 0.5 MG/DL
BUN SERPL-MCNC: 24.3 MG/DL (ref 6–20)
CALCIUM SERPL-MCNC: 9.1 MG/DL (ref 8.6–10)
CHLORIDE SERPL-SCNC: 100 MMOL/L (ref 98–107)
CREAT SERPL-MCNC: 1.28 MG/DL (ref 0.67–1.17)
DEPRECATED HCO3 PLAS-SCNC: 25 MMOL/L (ref 22–29)
EGFRCR SERPLBLD CKD-EPI 2021: 69 ML/MIN/1.73M2
GLUCOSE SERPL-MCNC: 124 MG/DL (ref 70–99)
HOLD SPECIMEN: NORMAL
POTASSIUM SERPL-SCNC: 4 MMOL/L (ref 3.4–5.3)
PROT SERPL-MCNC: 7.9 G/DL (ref 6.4–8.3)
SODIUM SERPL-SCNC: 138 MMOL/L (ref 135–145)

## 2024-02-20 PROCEDURE — 95800 SLP STDY UNATTENDED: CPT | Mod: 26 | Performed by: FAMILY MEDICINE

## 2024-02-20 PROCEDURE — 84403 ASSAY OF TOTAL TESTOSTERONE: CPT

## 2024-02-20 PROCEDURE — 80053 COMPREHEN METABOLIC PANEL: CPT

## 2024-02-20 PROCEDURE — 95800 SLP STDY UNATTENDED: CPT

## 2024-02-20 PROCEDURE — 84270 ASSAY OF SEX HORMONE GLOBUL: CPT

## 2024-02-20 PROCEDURE — 36415 COLL VENOUS BLD VENIPUNCTURE: CPT

## 2024-02-20 NOTE — TELEPHONE ENCOUNTER
8:23 AM    Reason for Call: Phone Call    Description: Patient is wondering if a letter has been sent to [employer] on his behalf explaining why he needs brand specific for Nexeum. Please call patient back.     Was an appointment offered for this call? No  If yes : Appointment type              Date    Preferred method for responding to this message: Telephone Call  What is your phone number ? [phone] - available after 2:30    If we cannot reach you directly, may we leave a detailed response at the number you provided? Yes      Can this message wait until your PCP/provider returns, if available today? Nicki Pat

## 2024-02-20 NOTE — LETTER
February 21, 2024      Pan Marroquin  [address] [place] [address]        To Whom It May Concern,       Patient is currently prescribed Nexium 40 mg. Patient has tried/failed generic Nexium twice, a Cost Selection Override is being requested.         Sincerely,        Karel Gonzalez, DO

## 2024-02-20 NOTE — PROGRESS NOTES
WatchPAT data has been received and has been scored using rule 1B, 4%. Patient to follow up with provider to determine appropriate therapy.    Pat AHI: 35.5    Ordering Provider: Dr Bladimir Hurst      Sleep Technician/Technologist: Ene HANCOCK

## 2024-02-20 NOTE — TELEPHONE ENCOUNTER
Call returned to patient, update provided for patient to reach out to [employer] to request the PA to be provided to the Clinic. Patient reports [facility] does not do that, [facility] is requesting the PA Team to reach out to [facility] at the phone # provided below and reference the PA # below.     Patient reports phone # [phone]    PA # [id number]    Patient is unable to take generic Nexium, in need of Name Brand Only.     Please advise.

## 2024-02-21 NOTE — PROCEDURES
"WatchPAT - HOME SLEEP STUDY INTERPRETATION    Patient: Pan Marroquin  MRN: [id number]  YOB: 1975  Study Date: 2024  Referring Provider: Karel Gonzalez  Ordering Provider: Bladimir Hurst MD, MD    Chain of custody patient verification was not enabled.      Indications for Home Study: Pan Marroquin is a 48 year old male with a history of RUSSELL, obesity, GERD  who presents with interest in assessing efficacy of self-fit oral appliance.    Prior Sleep Testin2015 - PSG with weight 310 lbs.  AHI 21.1.  Minimal sleep observed on CPAP.  2023 - WatchPAT HST with weight 330 lbs, BMI 42.5.  pAHI 94.1.  sustained hypoxemia was not present. Mean oxygen saturation was 91%.  Minimum was 75%.  Time with saturation less than 88% was 83.4 minutes.     Estimated body mass index is 41.09 kg/m  as calculated from the following:    Height as of 24: 1.88 m (6' 2\").    Weight as of 24: 145.2 kg (320 lb).  Total score - Comstock: 11 (2023  6:34 PM)  Total Score: 7 (2023  6:34 PM)    Data: A full night home sleep study was performed recording the standard physiologic parameters including peripheral arterial tonometry (PAT), sound/snoring, body position,  movement, sound, and oxygen saturation by pulse oximetry. Pulse rate was estimated by oximetry recording. Sleep staging (wake, REM, light, and deep sleep) was derived from PAT signal.  This study was considered adequate based on > 4 hours of quality oximetry and respiratory recording. As specified by the AASM Manual for the Scoring of Sleep and Associated events, version 2.3, Rule VIII.D 1B, 4% oxygen desaturation scoring for hypopneas is used as a standard of care on all home sleep apnea testing.    Total Recording Time: 7 hrs, 30 min  Total Sleep Time: 6 hrs, 42 min  % of Sleep Time REM: 15.5%    Respiratory:  Snoring: Snoring was present.  Respiratory events: The PAT respiratory disturbance index [pRDI] was 38.6 events per " hour.  The PAT apnea/hypopnea index [pAHI] was 35.5 events per hour.  MICHELLE was 34 events per hour.  During REM sleep the pAHI was 37.8.  Sleep Associated Hypoxemia: sustained hypoxemia was not present. Mean oxygen saturation was 94%.  Minimum was 85%.  Time with saturation less than 88% was 4.1 minutes.    Heart Rate: By pulse oximetry normal rate was noted.     Position: Percent of time spent: supine - 39.9%, prone - 3%, on right - 37.5%, on left - 19.4%.  pAHI was 18.9 per hour supine, 70.2 per hour prone, 26.1 per hour on right side, and 80.9 per hour on left side.     Assessment:   Severe obstructive sleep apnea.  Sleep associated hypoxemia was not present.    Recommendations:  Significant reduction in severity of RUSSELL with self-fit oral appliance.  Suggest optimizing sleep hygiene and avoiding sleep deprivation.  Weight management.    Diagnosis Code(s): Obstructive Sleep Apnea G47.33    Bladimir Hurst MD, MD, February 20, 2024   Diplomate, American Board of Family Medicine, Sleep Medicine

## 2024-02-21 NOTE — TELEPHONE ENCOUNTER
Letter completed by Dr. Gonzalez for PA appeal - Nexium 40 mg as patient is unable to take generic.         Roberth Wakefield  You3 hours ago (8:49 AM)     FÉLIX  I spoke to [facility] and will need to appeal the initial denial from December but they are wanting documentation as to why the patient can't take the generic. Would the provider write a letter?

## 2024-02-21 NOTE — TELEPHONE ENCOUNTER
Call placed to patient, update provided Letter has been completed by Dr. Gonzalez for [facility], PA Nexium name brands. See Letters.

## 2024-02-23 LAB — SHBG SERPL-SCNC: 42 NMOL/L (ref 11–80)

## 2024-02-23 NOTE — PROGRESS NOTES
"  Assessment & Plan   Problem List Items Addressed This Visit    None  Visit Diagnoses       Generalized edema    -  Primary    Relevant Medications    furosemide (LASIX) 20 MG tablet    Other Relevant Orders    EKG 12-lead complete w/read - (Clinic Performed)    Echocardiogram Complete    Comprehensive metabolic panel (BMP + Alb, Alk Phos, ALT, AST, Total. Bili, TP)    CBC with platelets and differential    BNP-N terminal pro    TSH with free T4 reflex    Weight gain        Relevant Medications    furosemide (LASIX) 20 MG tablet    Other Relevant Orders    EKG 12-lead complete w/read - (Clinic Performed)    Echocardiogram Complete    Comprehensive metabolic panel (BMP + Alb, Alk Phos, ALT, AST, Total. Bili, TP)    CBC with platelets and differential    BNP-N terminal pro    TSH with free T4 reflex           As outlined above the patient will start Lasix 20 mg daily for the next several days.  Labs will be obtained however I will not have a BMP available until the morning.  In addition thyroid studies have been requested along with a BNP.  EKG has been requested today along with an echocardiogram.  Patient will hold his amlodipine at this time.    25 minutes spent by me on the date of the encounter doing chart review, review of test results, interpretation of tests, patient visit, and documentation      BMI  Estimated body mass index is 46.14 kg/m  as calculated from the following:    Height as of 1/23/24: 1.88 m (6' 2\").    Weight as of this encounter: 163 kg (359 lb 6.4 oz).           Subjective   Pan is a 48 year old, presenting for the following health issues:  Leg Swelling    HPI     Pan 48-year-old male with a history of hypertension who presents today with significant swelling over the last 3 weeks or so.  Per our record it appears as though his weight has increased 39 pounds.  Per discussion with him he states at least 30 pounds.  He denies any chest pain he denies any shortness of breath.  He is on " amlodipine but has been for years.  He denies any change in his diet.  He denies any excessive sodium intake.    Discussion with the patient I did inform him that amlodipine can cause some peripheral edema however this seems excessive as to blame at all on amlodipine.  Nevertheless the patient is instructed to hold his amlodipine at the this time.    In addition he reports some strange symptoms as to a sensation that his tendons or muscles are tearing when he is trying to do simple tasks including kneeling and turning a doorknob.      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   Precipitating factors:        Worsened by: standing/walking.   Alleviating factors:        Improved by:  slight improvement with elevation   Therapies tried and outcome: elevating legs in recliner with minimal relief             Review of Systems  Constitutional, neuro, ENT, endocrine, pulmonary, cardiac, gastrointestinal, genitourinary, musculoskeletal, integument and psychiatric systems are negative, except as otherwise noted.      Objective    BP (!) 158/96 (BP Location: Left arm, Patient Position: Sitting, Cuff Size: Adult Large)   Pulse 72   Temp 98.4  F (36.9  C) (Tympanic)   Resp 22   Wt (!) 163 kg (359 lb 6.4 oz)   SpO2 97%   BMI 46.14 kg/m    Body mass index is 46.14 kg/m .  Physical Exam   GENERAL: alert and no distress  RESP: lungs clear to auscultation - no rales, rhonchi or wheezes  CV: Regular in rate and rhythm S1-S2 no murmurs were appreciated.  Heart sounds were distant.  ABDOMEN: Obese and  quite distended and edematous.  MS: +4 lower extremity edema extending up into his thighs abdomen and even his chest.  SKIN: no suspicious lesions or rashes  NEURO: Normal strength and tone, mentation intact and speech normal  PSYCH: mentation appears normal, affect normal/bright    Lab on 02/20/2024   Component Date Value Ref Range Status    Sodium 02/20/2024 138  135 - 145 mmol/L Final    Reference intervals for this test were updated on 09/26/2023 to more accurately reflect our healthy population. There may be differences in the flagging of prior results with similar values performed with this method. Interpretation of those prior results can be made in the context of the updated reference intervals.     Potassium 02/20/2024 4.0  3.4 - 5.3 mmol/L Final    Carbon Dioxide (CO2) 02/20/2024 25  22 - 29 mmol/L Final    Anion Gap 02/20/2024 13  7 - 15 mmol/L Final    Urea Nitrogen 02/20/2024 24.3 (H)  6.0 - 20.0 mg/dL Final    Creatinine 02/20/2024 1.28 (H)  0.67 - 1.17 mg/dL Final    GFR Estimate 02/20/2024 69  >60 mL/min/1.73m2 Final    Calcium 02/20/2024 9.1  8.6 - 10.0 mg/dL Final    Chloride 02/20/2024 100  98 - 107 mmol/L Final    Glucose 02/20/2024 124 (H)  70 - 99 mg/dL Final    Alkaline Phosphatase 02/20/2024 102  40 - 150 U/L Final    Reference intervals for this test were updated on 11/14/2023 to more accurately reflect our healthy population. There may be differences in the flagging of prior results with similar values performed with this method. Interpretation of those prior results can be made in the context of the updated reference intervals.    AST 02/20/2024 74 (H)  0 - 45 U/L Final    Reference intervals for this test were updated on 6/12/2023 to more accurately reflect our healthy population. There may be differences in the flagging of prior results with similar values performed with this method. Interpretation of those prior results can be made in the context of the updated reference intervals.     ALT 02/20/2024 112 (H)  0 - 70 U/L Final    Reference intervals for this test were updated on 6/12/2023 to more accurately reflect our healthy population. There may be differences in the flagging of prior results with similar values performed with this method. Interpretation of those prior results can be made in the context of the updated reference intervals.      Protein Total 02/20/2024 7.9  6.4 - 8.3 g/dL Final    Albumin 02/20/2024 4.3  3.5 - 5.2 g/dL Final    Bilirubin Total 02/20/2024 0.5  <=1.2 mg/dL Final    Hold Specimen 02/20/2024 [facility]   Final    Hold Specimen 02/20/2024 [facility]   Final    Hold Specimen 02/20/2024 [facility]   Final    Sex Hormone Binding Globulin 02/20/2024 42  11 - 80 nmol/L Final    Free Testosterone Calculated 02/20/2024 5.47  ng/dL Final    Male Jason Ranges:  Jason Stage I: Less than or equal to 0.37 ng/dL  Jason Stage II: 0.03-2.1 ng/dL  Jason Stage III: 0.10-9.8 ng/dL  Jason Stage IV: 3.5-16.9 ng/dL  Jason Stage V: 4.1-23.9 ng/dL    Testosterone Total 02/20/2024 321  240 - 950 ng/dL Final     No results found for any visits on 02/26/24.  No results found for this or any previous visit (from the past 24 hour(s)).      EKG   NSR rate 74.      Signed Electronically by: Karel Goznalez DO

## 2024-02-25 LAB
TESTOST FREE SERPL-MCNC: 5.47 NG/DL
TESTOST SERPL-MCNC: 321 NG/DL (ref 240–950)

## 2024-02-26 ENCOUNTER — OFFICE VISIT (OUTPATIENT)
Dept: INTERNAL MEDICINE | Facility: OTHER | Age: 49
End: 2024-02-26
Attending: INTERNAL MEDICINE
Payer: COMMERCIAL

## 2024-02-26 VITALS
WEIGHT: 315 LBS | TEMPERATURE: 98.4 F | SYSTOLIC BLOOD PRESSURE: 158 MMHG | DIASTOLIC BLOOD PRESSURE: 96 MMHG | HEART RATE: 72 BPM | BODY MASS INDEX: 46.14 KG/M2 | OXYGEN SATURATION: 97 % | RESPIRATION RATE: 22 BRPM

## 2024-02-26 DIAGNOSIS — R60.1 GENERALIZED EDEMA: Primary | ICD-10-CM

## 2024-02-26 DIAGNOSIS — R63.5 WEIGHT GAIN: ICD-10-CM

## 2024-02-26 LAB
ALBUMIN SERPL BCG-MCNC: 4.1 G/DL (ref 3.5–5.2)
ALP SERPL-CCNC: 100 U/L (ref 40–150)
ALT SERPL W P-5'-P-CCNC: 102 U/L (ref 0–70)
ANION GAP SERPL CALCULATED.3IONS-SCNC: 14 MMOL/L (ref 7–15)
AST SERPL W P-5'-P-CCNC: 55 U/L (ref 0–45)
BASOPHILS # BLD AUTO: 0 10E3/UL (ref 0–0.2)
BASOPHILS NFR BLD AUTO: 0 %
BILIRUB SERPL-MCNC: 0.5 MG/DL
BUN SERPL-MCNC: 23.4 MG/DL (ref 6–20)
CALCIUM SERPL-MCNC: 8.9 MG/DL (ref 8.6–10)
CHLORIDE SERPL-SCNC: 103 MMOL/L (ref 98–107)
CREAT SERPL-MCNC: 1.12 MG/DL (ref 0.67–1.17)
DEPRECATED HCO3 PLAS-SCNC: 22 MMOL/L (ref 22–29)
EGFRCR SERPLBLD CKD-EPI 2021: 81 ML/MIN/1.73M2
EOSINOPHIL # BLD AUTO: 0.5 10E3/UL (ref 0–0.7)
EOSINOPHIL NFR BLD AUTO: 6 %
ERYTHROCYTE [DISTWIDTH] IN BLOOD BY AUTOMATED COUNT: 12.4 % (ref 10–15)
GLUCOSE SERPL-MCNC: 130 MG/DL (ref 70–99)
HCT VFR BLD AUTO: 45.3 % (ref 40–53)
HGB BLD-MCNC: 16.3 G/DL (ref 13.3–17.7)
IMM GRANULOCYTES # BLD: 0 10E3/UL
IMM GRANULOCYTES NFR BLD: 0 %
LYMPHOCYTES # BLD AUTO: 2.7 10E3/UL (ref 0.8–5.3)
LYMPHOCYTES NFR BLD AUTO: 31 %
MCH RBC QN AUTO: 28.7 PG (ref 26.5–33)
MCHC RBC AUTO-ENTMCNC: 36 G/DL (ref 31.5–36.5)
MCV RBC AUTO: 80 FL (ref 78–100)
MONOCYTES # BLD AUTO: 0.6 10E3/UL (ref 0–1.3)
MONOCYTES NFR BLD AUTO: 7 %
NEUTROPHILS # BLD AUTO: 5 10E3/UL (ref 1.6–8.3)
NEUTROPHILS NFR BLD AUTO: 57 %
NT-PROBNP SERPL-MCNC: 43 PG/ML (ref 0–450)
PLATELET # BLD AUTO: 203 10E3/UL (ref 150–450)
POTASSIUM SERPL-SCNC: 4.1 MMOL/L (ref 3.4–5.3)
PROT SERPL-MCNC: 7.7 G/DL (ref 6.4–8.3)
RBC # BLD AUTO: 5.68 10E6/UL (ref 4.4–5.9)
SODIUM SERPL-SCNC: 139 MMOL/L (ref 135–145)
TSH SERPL DL<=0.005 MIU/L-ACNC: 1.56 UIU/ML (ref 0.3–4.2)
WBC # BLD AUTO: 8.9 10E3/UL (ref 4–11)

## 2024-02-26 PROCEDURE — 83880 ASSAY OF NATRIURETIC PEPTIDE: CPT | Performed by: INTERNAL MEDICINE

## 2024-02-26 PROCEDURE — 93000 ELECTROCARDIOGRAM COMPLETE: CPT | Mod: 76 | Performed by: INTERNAL MEDICINE

## 2024-02-26 PROCEDURE — 99214 OFFICE O/P EST MOD 30 MIN: CPT | Performed by: INTERNAL MEDICINE

## 2024-02-26 PROCEDURE — 36415 COLL VENOUS BLD VENIPUNCTURE: CPT | Performed by: INTERNAL MEDICINE

## 2024-02-26 PROCEDURE — 80050 GENERAL HEALTH PANEL: CPT | Performed by: INTERNAL MEDICINE

## 2024-02-26 RX ORDER — FUROSEMIDE 20 MG
20 TABLET ORAL DAILY
Qty: 7 TABLET | Refills: 0 | Status: SHIPPED | OUTPATIENT
Start: 2024-02-26

## 2024-02-26 ASSESSMENT — PAIN SCALES - GENERAL: PAINLEVEL: NO PAIN (0)

## 2024-02-27 LAB
ATRIAL RATE - MUSE: 76 BPM
DIASTOLIC BLOOD PRESSURE - MUSE: NORMAL MMHG
INTERPRETATION ECG - MUSE: NORMAL
P AXIS - MUSE: 16 DEGREES
PR INTERVAL - MUSE: 144 MS
QRS DURATION - MUSE: 86 MS
QT - MUSE: 414 MS
QTC - MUSE: 465 MS
R AXIS - MUSE: 18 DEGREES
SYSTOLIC BLOOD PRESSURE - MUSE: NORMAL MMHG
T AXIS - MUSE: 37 DEGREES
VENTRICULAR RATE- MUSE: 76 BPM

## 2024-03-05 ENCOUNTER — HOSPITAL ENCOUNTER (OUTPATIENT)
Dept: CARDIOLOGY | Facility: HOSPITAL | Age: 49
Discharge: HOME | End: 2024-03-05
Attending: INTERNAL MEDICINE | Admitting: INTERNAL MEDICINE
Payer: COMMERCIAL

## 2024-03-05 DIAGNOSIS — R63.5 WEIGHT GAIN: ICD-10-CM

## 2024-03-05 DIAGNOSIS — R60.1 GENERALIZED EDEMA: ICD-10-CM

## 2024-03-05 LAB — LVEF ECHO: NORMAL

## 2024-03-05 PROCEDURE — 999N000208 ECHOCARDIOGRAM COMPLETE

## 2024-03-05 PROCEDURE — 93306 TTE W/DOPPLER COMPLETE: CPT | Mod: 26 | Performed by: INTERNAL MEDICINE

## 2024-03-05 PROCEDURE — 255N000002 HC RX 255 OP 636: Performed by: INTERNAL MEDICINE

## 2024-03-05 RX ADMIN — PERFLUTREN 2 ML: 6.52 INJECTION, SUSPENSION INTRAVENOUS at 15:54

## 2024-03-06 ENCOUNTER — TELEPHONE (OUTPATIENT)
Dept: INTERNAL MEDICINE | Facility: OTHER | Age: 49
End: 2024-03-06

## 2024-03-06 DIAGNOSIS — K21.00 GASTROESOPHAGEAL REFLUX DISEASE WITH ESOPHAGITIS, UNSPECIFIED WHETHER HEMORRHAGE: ICD-10-CM

## 2024-03-06 RX ORDER — ESOMEPRAZOLE MAGNESIUM 40 MG
CAPSULE,DELAYED RELEASE (ENTERIC COATED) ORAL
Qty: 90 CAPSULE | Refills: 3 | Status: CANCELLED | OUTPATIENT
Start: 2024-03-06

## 2024-03-06 NOTE — TELEPHONE ENCOUNTER
11:33 AM    Reason for Call: Phone Call    Description: Patient called in stating that his nexium has been approved by insurance at the cost of generics but needs a prescription sent to [facility]. [facility] states that it was cancelled in August. Please call patient back with any questions.    Was an appointment offered for this call? No  If yes : Appointment type              Date    Preferred method for responding to this message: Telephone Call  What is your phone number ? [phone]     If we cannot reach you directly, may we leave a detailed response at the number you provided? Yes    Can this message wait until your PCP/provider returns, if available today? Nicki Pat

## 2024-03-07 ENCOUNTER — VIRTUAL VISIT (OUTPATIENT)
Dept: PULMONOLOGY | Facility: OTHER | Age: 49
End: 2024-03-07
Attending: FAMILY MEDICINE
Payer: COMMERCIAL

## 2024-03-07 VITALS — BODY MASS INDEX: 40.43 KG/M2 | WEIGHT: 315 LBS | HEIGHT: 74 IN

## 2024-03-07 DIAGNOSIS — G47.33 OSA (OBSTRUCTIVE SLEEP APNEA): Primary | ICD-10-CM

## 2024-03-07 DIAGNOSIS — Z78.9 INTOLERANCE OF CONTINUOUS POSITIVE AIRWAY PRESSURE (CPAP) VENTILATION: ICD-10-CM

## 2024-03-07 PROCEDURE — 99442 PR PHYSICIAN TELEPHONE EVALUATION 11-20 MIN: CPT | Mod: 93 | Performed by: FAMILY MEDICINE

## 2024-03-07 RX ORDER — ESOMEPRAZOLE MAGNESIUM 40 MG
CAPSULE,DELAYED RELEASE (ENTERIC COATED) ORAL
Qty: 90 CAPSULE | Refills: 0 | Status: SHIPPED | OUTPATIENT
Start: 2024-03-07

## 2024-03-07 ASSESSMENT — PAIN SCALES - GENERAL: PAINLEVEL: NO PAIN (0)

## 2024-03-07 NOTE — PROGRESS NOTES
"Virtual Visit Details    Type of service:  Telephone Visit   Phone call duration: *** minutes   Originating Location (pt. Location): {patient location:[id number]::\"Home\"}  {PROVIDER LOCATION On-site should be selected for visits conducted from your clinic location or adjoining [facility] hospital, academic office, or other nearby [facility] building. Off-site should be selected for all other provider locations, including home:[id number]}  Distant Location (provider location):  {virtual location provider:[id number]}  "

## 2024-03-07 NOTE — PROGRESS NOTES
Pan Marroquin is a 48 year old male who is being evaluated via a billable telephone visit.       What phone number would you like to be contacted at?PHONE@ [phone]  Home Phone [phone]   Work Phone Not on file.   Mobile [phone]       How would you like to obtain your AVS? [employer]       Telephone Virtual Visit Details     Type of service:  Telephone Virtual Visit     Originating Location (pt. Location): Home     Distant Location (provider location):  Off-site, [facility]      Start Time:  2:30pm  End Time: 2:50 PM    Virtual visit for home sleep test results.     A/P:     1.) Severe RUSSELL (pAHI 94.1) with significant sleep-associated hypoxemia (SpO2 <= 88% for 83.4 minutes).     We discussed his history of significant CPAP intolerance.  Given the severity of his obstructive sleep apnea and significant previous CPAP intolerance, I feel that bilevel PAP spontaneous is an appropriate choice with likely significant health benefit and presumed significant daytime benefit.     Overall, he also feels he has been unable to tolerate bilevel PAP with settings attempted a minimum EPAP of 4 and 5, maximum IPAP 10 and 15, pressure support 4 and 5.     There was significant improvements in apnea severity with use of self fit mandibular advancement device.  pAHI reduced from 94.1 down to 35.5, along with resolution of nocturnal hypoxemia.    Our plan for today is to proceed with a formal sleep dental evaluation for hopeful set up of a custom fit mandibular advancement device for obstructive sleep apnea.  We will send this referral to his primary dentist, Dr. Nolasco, of the dental health services clinic in [place].  I feel that he is a good candidate given his surprise and good response to the self fit devices.  We also discussed the importance of ongoing weight management.    Plan for follow-up in roughly 3 to 6 months and we will plan to repeat home sleep testing with the dentist made  "mandibular advancement device at that time.    [email or web address]  Dental Health Services  Dr. Nolasco  [address] [place] [address]  Phone: [phone]    SUBJECTIVE:  Pan Marroquin is a 48 year old male.    Pertinent PMHx of RUSSELL, obesity, GERD.     Prior Sleep Testin2015 - PSG with weight 310 lbs.  AHI 21.1.  Minimal sleep observed on CPAP.  2023 - [facility] HST with weight 330 lbs, BMI 42.5.  pAHI 94.1.  sustained hypoxemia was not present. Mean oxygen saturation was 91%.  Minimum was 75%.  Time with saturation less than 88% was 83.4 minutes.   2024 - [facility] HST with weight 320 lbs, BMI 41 and use of self-fit mandibular advancement device.  pAHI 35.5.  sustained hypoxemia was not present. Mean oxygen saturation was 94%.  Minimum was 85%.  Time with saturation less than 88% was 4.1 minutes.     2023 -we reviewed his sleep history more detail.  His current concerns include loud snoring, observed apnea, frequent nighttime awakenings and significant daytime fatigue.     He did try CPAP following his sleep study in , but he found it very difficult to tolerate with a feeling of excessive pressure and collapse in his posterior oropharynx and this led to prompt discontinuation.  He did try multiple different mask interfaces.     Chief concern per questionnaire: \"Problems with sleep \"     Goals for visit per questionnaire: \"Be able to manage my sleep apnea \"     Caffeine use:  No for 3+ per day.  No for within 6 hours of bed.     Sleep pattern:  Workdays.  8pm - 4:30am.  Weekends.  10pm - 8am.  Time to fall asleep: ~30 minutes.  Awakenings: 4-5 times per night, short minutes to return to sleep.  Average total sleep time:  6 hours  Napping.  2 days per week, 0.75 hours per nap.     No for RLS screen.  No for sleep walking.  No for dream enactment behavior.  No for bruxism.     Yes for morning headaches.  Yes " for snoring.  Yes for observed apnea.  Yes for FHx of RUSSELL.     A/P for all-night PAP titration PSG versus start of empiric bilevel PAP auto-titrate on room air given history of significant CPAP intolerance.  Given his work schedule, he wanted to start treatment.  Orders for bilevel PAP auto-titrate with min EPAP 5, max IPAP 15, PS 5 spontaneous mode on room air with follow-up LISA on treatment if AHI < 10.     1/23/2024 -he reports that he has largely been unable to tolerate the bilevel even following the pressure reduction with current minimum EPAP 4, maximum IPAP 10, pressures were 4.  He feels that it is hard to catch his breath.  Given these frustrations, he did start using a self fit mandibular advancement device and feels that it has been quite effective with significant reduction in awakenings and feeling less tired.     Bilevel PAP download showing no usage.    A/P for home sleep test with self fit mandibular advancement device.  If not effective, consider all-night Pap titration sleep study and could consider upper airway stimulation if successful weight loss to reduce BMI to less than 40.    Today -he feels that he continues to sleep quite well with the self fit mouthguard device, but he is open to considering a better quality version of the mouthguard.    His current dentist is Dr. Nolasco at the dental health services clinic in [place].  Per their website, they do work with the sleep apnea mandibular advancement devices.    [facility] - HOME SLEEP STUDY INTERPRETATION     Patient: Pan Marroquin  MRN: [id number]  YOB: 1975  Study Date: 2/14/2024  Referring Provider: Karel Gonzalez  Ordering Provider: Bladimir Hurst MD, MD     Chain of custody patient verification was not enabled.      Indications for Home Study: Pan Marroquin is a 48 year old male with a history of RUSSELL, obesity, GERD  who presents with interest in assessing efficacy of self-fit oral appliance.     Prior Sleep  "Testin2015 - PSG with weight 310 lbs.  AHI 21.1.  Minimal sleep observed on CPAP.  2023 - [facility] HST with weight 330 lbs, BMI 42.5.  pAHI 94.1.  sustained hypoxemia was not present. Mean oxygen saturation was 91%.  Minimum was 75%.  Time with saturation less than 88% was 83.4 minutes.      Estimated body mass index is 41.09 kg/m  as calculated from the following:    Height as of 24: 1.88 m (6' 2\").    Weight as of 24: 145.2 kg (320 lb).  Total score - [place]: 11 (2023  6:34 PM)  Total Score: 7 (2023  6:34 PM)     Data: A full night home sleep study was performed recording the standard physiologic parameters including peripheral arterial tonometry (PAT), sound/snoring, body position,  movement, sound, and oxygen saturation by pulse oximetry. Pulse rate was estimated by oximetry recording. Sleep staging (wake, REM, light, and deep sleep) was derived from PAT signal.  This study was considered adequate based on > 4 hours of quality oximetry and respiratory recording. As specified by the AASM Manual for the Scoring of Sleep and Associated events, version 2.3, Rule VIII.D 1B, 4% oxygen desaturation scoring for hypopneas is used as a standard of care on all home sleep apnea testing.     Total Recording Time: 7 hrs, 30 min  Total Sleep Time: 6 hrs, 42 min  % of Sleep Time REM: 15.5%     Respiratory:  Snoring: Snoring was present.  Respiratory events: The PAT respiratory disturbance index [pRDI] was 38.6 events per hour.  The PAT apnea/hypopnea index [pAHI] was 35.5 events per hour.  MICHELLE was 34 events per hour.  During REM sleep the pAHI was 37.8.  Sleep Associated Hypoxemia: sustained hypoxemia was not present. Mean oxygen saturation was 94%.  Minimum was 85%.  Time with saturation less than 88% was 4.1 minutes.     Heart Rate: By pulse oximetry normal rate was noted.      Position: Percent of time spent: supine - 39.9%, prone - 3%, on right - 37.5%, on left - 19.4%.  pAHI was 18.9 " per hour supine, 70.2 per hour prone, 26.1 per hour on right side, and 80.9 per hour on left side.      Assessment:   Severe obstructive sleep apnea.  Sleep associated hypoxemia was not present.     Recommendations:  Significant reduction in severity of RUSSELL with self-fit oral appliance.  Suggest optimizing sleep hygiene and avoiding sleep deprivation.  Weight management.     Diagnosis Code(s): Obstructive Sleep Apnea G47.33     Bladimir Hurst MD, MD, February 20, 2024   Diplomate, American Board of Family Medicine, Sleep Medicine    Past medical history:    Patient Active Problem List    Diagnosis Date Noted    Obesity (BMI 35.0-39.9) with comorbidity (H) 01/07/2019     Priority: Medium    ACP (advance care planning) 06/06/2016     Priority: Medium     Advance Care Planning 6/6/2016: ACP Review of Chart / Resources Provided:  Reviewed chart for advance care plan.  Pan Marroquin has no plan or code status on file. Discussed available resources and provided with information. Confirmed code status reflects current choices pending further ACP discussions.  Confirmed/documented legally designated decision makers.  Added by Kalpana Faith 06/06/2016     Priority: Medium    RUSSELL (obstructive sleep apnea) 12/08/2015     Priority: Medium    Obesity 09/12/2011     Priority: Medium    GERD (gastroesophageal reflux disease) 04/20/2005     Priority: Medium       10 point ROS of systems including Constitutional, Eyes, Respiratory, Cardiovascular, Gastroenterology, Genitourinary, Integumentary, Muscularskeletal, Psychiatric were all negative except for pertinent positives noted in my HPI.    Current Outpatient Medications   Medication Sig Dispense Refill    amLODIPine (NORVASC) 10 MG tablet TAKE 1 TABLET DAILY 90 tablet 3    furosemide (LASIX) 20 MG tablet Take 1 tablet (20 mg) by mouth daily 7 tablet 0    mometasone (ELOCON) 0.1 % external cream APPLY SPARINGLY TO AFFECTED AREA TWICE A DAY AS NEEDED.  DO NOT APPLY TO FACE 45 g 1    NEXIUM 40 MG DR capsule TAKE 1 CAPSULE EVERY MORNING BEFORE BREAKFAST 30 TO 60 MINUTES BEFORE EATING 90 capsule 0       OBJECTIVE:  There were no vitals taken for this visit.    Physical Exam:  healthy, alert, and no distress  PSYCH: Alert and oriented times 3; coherent speech, normal   rate and volume, able to articulate logical thoughts, able   to abstract reason, no tangential thoughts, no hallucinations   or delusions  His affect is normal  RESP: No cough, no audible wheezing, able to talk in full sentences  Remainder of exam unable to be completed due to telephone visits    ---  This note was written with the assistance of the Dragon voice-dictation technology software. The final document, although reviewed, may contain errors. For corrections, please contact the office.    Total time spent in conversation with patient on the phone today was 20 minutes.    Bladimir Hurst MD    Sleep Medicine  [place], [place]  Main Office: [phone]  [facility] [place]  [address] [place] [address], [address]  Schedule visits: [phone]  Main Office: [phone]  Fax: [phone]

## 2024-03-07 NOTE — NURSING NOTE
Is the patient currently in the state of [place]? YES    Visit mode:telephone    If the visit is dropped, the patient can be reconnected by: VIDEO VISIT: Text to cell phone:   Telephone Information:   Mobile [phone]       Will anyone else be joining the visit? NO  (If patient encounters technical issues they should call [phone]    How would you like to obtain your AVS? MyChart    Are changes needed to the allergy or medication list? No    Reason for visit: RECHECK    Alex DANIELS

## 2024-03-08 ENCOUNTER — TELEPHONE (OUTPATIENT)
Dept: INTERNAL MEDICINE | Facility: OTHER | Age: 49
End: 2024-03-08

## 2024-03-08 NOTE — TELEPHONE ENCOUNTER
10:05 AM    Reason for Call: Phone Call    Description: Patient states he has been going back and forth with [facility] about his Nexium. They are now requesting a letter of medical necessity stating why he has to have brand name Nexium and why they should give him generic pricing for brand name. He said if there are any questions he will be available after 1 pm.     Was an appointment offered for this call? No  If yes : Appointment type              Date    Preferred method for responding to this message: Telephone Call  What is your phone number ? [phone]     If we cannot reach you directly, may we leave a detailed response at the number you provided? Yes    Can this message wait until your PCP/provider returns, if available today? Not applicable    Amna Henry

## 2024-03-08 NOTE — TELEPHONE ENCOUNTER
Call returned to patient, update provided the Letter from Dr. Gonzalez has been faxed previously to [facility].     Patient states he does not know what is going on, when patient reached out to [facility] again on filling Nexium, he was provided with an update a Letter is required from the PCP.     Notified patient the letter will be faxed again to [facility].       Patient verbalized understanding. Update provided to follow up with any questions/concerns.

## 2024-03-08 NOTE — TELEPHONE ENCOUNTER
Letter from Dr. Gonzalez dated 2/21/24 (see Letters Encounter) has been printed again and faxed to [facility] at [phone] per patient request.

## 2024-04-03 ENCOUNTER — TELEPHONE (OUTPATIENT)
Dept: PULMONOLOGY | Facility: OTHER | Age: 49
End: 2024-04-03

## 2024-04-03 DIAGNOSIS — G47.33 OSA (OBSTRUCTIVE SLEEP APNEA): Primary | ICD-10-CM

## 2024-04-03 DIAGNOSIS — Z78.9 INTOLERANCE OF CONTINUOUS POSITIVE AIRWAY PRESSURE (CPAP) VENTILATION: ICD-10-CM

## 2024-04-09 ENCOUNTER — DOCUMENTATION ONLY (OUTPATIENT)
Dept: PULMONOLOGY | Facility: OTHER | Age: 49
End: 2024-04-09

## 2024-07-17 DIAGNOSIS — I10 PRIMARY HYPERTENSION: ICD-10-CM

## 2024-07-18 RX ORDER — AMLODIPINE BESYLATE 10 MG/1
TABLET ORAL
Qty: 90 TABLET | Refills: 2 | Status: SHIPPED | OUTPATIENT
Start: 2024-07-18

## 2024-07-18 NOTE — TELEPHONE ENCOUNTER
AMLODIPINE BESYLATE TABS 10MG         Last Written Prescription Date:  7/24/23  Last Fill Quantity: 90,   # refills: 3  Last Office Visit: 2/26/24  Future Office visit:       Routing refill request to provider for review/approval because:      Calcium Channel Blockers Protocol  [id number]/17/2024 11:21 PM   Protocol Details Blood pressure under 140/90 in past 12 months       BP Readings from Last 3 Encounters:   02/26/24 (!) 158/96   01/04/24 134/88   02/27/23 (!) 147/95

## 2024-07-18 NOTE — PROGRESS NOTES
"  Assessment & Plan   Problem List Items Addressed This Visit    None  Visit Diagnoses       Primary hypertension    -  Primary    Relevant Medications    losartan (COZAAR) 25 MG tablet    Right-sided low back pain with left-sided sciatica, unspecified chronicity        Relevant Medications    methylPREDNISolone (MEDROL DOSEPAK) 4 MG tablet therapy pack    LUND (dyspnea on exertion)        Relevant Orders    NM MPI Treadmill           Will start Zartan for blood pressure management at this time.  However there is some consideration that blood pressure may be elevated due to discomfort.  He has experienced some associated increase in lower extremity edema associated with calcium channel blocker, he states there is some history of anxiety with beta-blockers.  Lisinopril was said to cause myalgias.  Hence losartan was started today.  In regard to his dyspnea on exertion a stress test has been requested.  In regard to his right-sided sciatica we will try Medrol Dosepak.  If no improvement consideration for imaging and epidural injections.       BMI  Estimated body mass index is 45.58 kg/m  as calculated from the following:    Height as of 3/7/24: 1.88 m (6' 2\").    Weight as of this encounter: 161 kg (355 lb).           Srinath Perla is a 48 year old, presenting for the following health issues:  Back Pain    History of Present Illness       Reason for visit:  Nerve pain  Symptom onset:  More than a month  Symptom intensity:  Moderate  Symptom progression:  Staying the same  Had these symptoms before:  Yes  Has tried/received treatment for these symptoms:  No    He eats 0-1 servings of fruits and vegetables daily.He consumes 0 sweetened beverage(s) daily.He exercises with enough effort to increase his heart rate 30 to 60 minutes per day.  He exercises with enough effort to increase his heart rate 5 days per week. He is missing 1 dose(s) of medications per week.       Concern - shortness of breath and heart feels " racing when climbing stairs/exertion   Onset: x 2 months   Description: shortness of breath with exertion   Intensity:   Progression of Symptoms:  worsening  Accompanying Signs & Symptoms:   Previous history of similar problem: none  Precipitating factors:        Worsened by: exertion   Alleviating factors:        Improved by: rest  Therapies tried and outcome: None    BP elevated, patient reports not taking amlodipine or lasix as previously prescribed       Pan returns to clinic today for follow-up.  He does report sciatica which has been a problem for him in the past.  He states his last episode was about 6 years ago.  He denies any bowel or bladder incontinence.  He states that once he is standing and walking he does fine however getting up from a seated position leads to back pain and shooting of the pain down the back of his right leg into his right calf.  No trauma is reported.  He has tried some over-the-counter remedies and NSAIDs with minimal benefit.  In addition he does report dyspnea on exertion.  He denies any associated chest pain.  He denies overt palpitations however he feels as though his heart beats hard when he exerts himself.  He does not have any history of smoking lung disease or asthma.  His blood pressure is elevated today possibly related to some discomfort.  He was previously on other blood pressure meds including a beta-blocker however he felt that though this led to anxiety.  He also had tried amlodipine.  He had some lower extremity edema associated with this.  After discontinuing the amlodipine he does still continue to have lower extremity edema however he states it is much improved.  In addition EKG and echocardiogram were done without any profound findings.          Review of Systems  Constitutional, HEENT, cardiovascular, pulmonary, gi and gu systems are negative, except as otherwise noted.      Objective    BP (!) 168/110 (BP Location: Left arm, Patient Position: Sitting, Cuff  Size: Adult Large)   Pulse 72   Temp 97.6  F (36.4  C) (Tympanic)   Resp 22   Wt (!) 161 kg (355 lb)   SpO2 96%   BMI 45.58 kg/m    Body mass index is 45.58 kg/m .  Physical Exam   GENERAL: alert and no distress  RESP: lungs clear to auscultation - no rales, rhonchi or wheezes  CV: regular rate and rhythm, normal S1 S2, no S3 or S4, no murmur, click or rub, no peripheral edema  MS: Trace to +1 lower extremity edema bilaterally.  PSYCH: mentation appears normal, affect normal/[facility] Outpatient Visit on 03/05/2024   Component Date Value Ref Range Status    LVEF  03/05/2024 60-65%   Final     No results found for any visits on 07/22/24.  No results found for this or any previous visit (from the past 24 hour(s)).        Signed Electronically by: Karel Gonzalez DO

## 2024-07-22 ENCOUNTER — OFFICE VISIT (OUTPATIENT)
Dept: INTERNAL MEDICINE | Facility: OTHER | Age: 49
End: 2024-07-22
Attending: INTERNAL MEDICINE
Payer: COMMERCIAL

## 2024-07-22 VITALS
OXYGEN SATURATION: 96 % | WEIGHT: 315 LBS | SYSTOLIC BLOOD PRESSURE: 168 MMHG | TEMPERATURE: 97.6 F | DIASTOLIC BLOOD PRESSURE: 110 MMHG | BODY MASS INDEX: 45.58 KG/M2 | RESPIRATION RATE: 22 BRPM | HEART RATE: 72 BPM

## 2024-07-22 DIAGNOSIS — M54.42 RIGHT-SIDED LOW BACK PAIN WITH LEFT-SIDED SCIATICA, UNSPECIFIED CHRONICITY: ICD-10-CM

## 2024-07-22 DIAGNOSIS — I10 PRIMARY HYPERTENSION: Primary | ICD-10-CM

## 2024-07-22 DIAGNOSIS — R06.09 DOE (DYSPNEA ON EXERTION): ICD-10-CM

## 2024-07-22 PROCEDURE — 99214 OFFICE O/P EST MOD 30 MIN: CPT | Performed by: INTERNAL MEDICINE

## 2024-07-22 RX ORDER — METHYLPREDNISOLONE 4 MG
TABLET, DOSE PACK ORAL
Qty: 21 TABLET | Refills: 0 | Status: SHIPPED | OUTPATIENT
Start: 2024-07-22

## 2024-07-22 RX ORDER — LOSARTAN POTASSIUM 25 MG/1
25 TABLET ORAL DAILY
Qty: 30 TABLET | Refills: 1 | Status: SHIPPED | OUTPATIENT
Start: 2024-07-22 | End: 2024-09-23

## 2024-07-22 ASSESSMENT — PAIN SCALES - GENERAL: PAINLEVEL: MODERATE PAIN (4)

## 2024-07-22 ASSESSMENT — ENCOUNTER SYMPTOMS: BACK PAIN: 1

## 2024-07-23 ENCOUNTER — TELEPHONE (OUTPATIENT)
Dept: NUCLEAR MEDICINE | Facility: HOSPITAL | Age: 49
End: 2024-07-23

## 2024-07-23 NOTE — TELEPHONE ENCOUNTER
Appointment Reminder Call    -exam prep  -where and when to register  -appointment length with waiting time

## 2024-07-25 ENCOUNTER — HOSPITAL ENCOUNTER (OUTPATIENT)
Dept: NUCLEAR MEDICINE | Facility: HOSPITAL | Age: 49
Setting detail: NUCLEAR MEDICINE
Discharge: HOME | End: 2024-07-25
Attending: INTERNAL MEDICINE
Payer: COMMERCIAL

## 2024-07-25 ENCOUNTER — HOSPITAL ENCOUNTER (OUTPATIENT)
Dept: CARDIOLOGY | Facility: HOSPITAL | Age: 49
Setting detail: NUCLEAR MEDICINE
Discharge: HOME | End: 2024-07-25
Attending: INTERNAL MEDICINE
Payer: COMMERCIAL

## 2024-07-25 DIAGNOSIS — R06.09 DOE (DYSPNEA ON EXERTION): ICD-10-CM

## 2024-07-25 LAB
CV BLOOD PRESSURE: 55 MMHG
CV STRESS MAX HR HE: 127
NUC STRESS EJECTION FRACTION: 69 %
RATE PRESSURE PRODUCT: NORMAL
STRESS ECHO BASELINE DIASTOLIC HE: 98
STRESS ECHO BASELINE HR: 67 BPM
STRESS ECHO BASELINE SYSTOLIC BP: 154
STRESS ECHO CALCULATED PERCENT HR: 74 %
STRESS ECHO LAST STRESS DIASTOLIC BP: 98
STRESS ECHO LAST STRESS SYSTOLIC BP: 232
STRESS ECHO POST ESTIMATED WORKLOAD: 7.1 METS
STRESS ECHO POST EXERCISE DUR MIN: 5 MIN
STRESS ECHO POST EXERCISE DUR SEC: 13 SEC
STRESS ECHO TARGET HR: 172

## 2024-07-25 PROCEDURE — 343N000001 HC RX 343: Performed by: RADIOLOGY

## 2024-07-25 PROCEDURE — A9500 TC99M SESTAMIBI: HCPCS | Performed by: RADIOLOGY

## 2024-07-25 PROCEDURE — 78452 HT MUSCLE IMAGE SPECT MULT: CPT

## 2024-07-25 PROCEDURE — 93018 CV STRESS TEST I&R ONLY: CPT | Performed by: INTERNAL MEDICINE

## 2024-07-25 PROCEDURE — 93017 CV STRESS TEST TRACING ONLY: CPT

## 2024-07-25 PROCEDURE — 93016 CV STRESS TEST SUPVJ ONLY: CPT | Performed by: INTERNAL MEDICINE

## 2024-07-25 PROCEDURE — 258N000003 HC RX IP 258 OP 636: Performed by: INTERNAL MEDICINE

## 2024-07-25 RX ORDER — SODIUM CHLORIDE 9 MG/ML
INJECTION, SOLUTION INTRAVENOUS ONCE
Status: COMPLETED | OUTPATIENT
Start: 2024-07-25 | End: 2024-07-25

## 2024-07-25 RX ADMIN — Medication 32.9 MILLICURIE: at 09:41

## 2024-07-25 RX ADMIN — SODIUM CHLORIDE: 9 INJECTION, SOLUTION INTRAVENOUS at 09:47

## 2024-07-25 RX ADMIN — Medication 11 MILLICURIE: at 07:22

## 2024-08-16 ENCOUNTER — TELEPHONE (OUTPATIENT)
Dept: INTERNAL MEDICINE | Facility: OTHER | Age: 49
End: 2024-08-16

## 2024-08-16 NOTE — TELEPHONE ENCOUNTER
2:04 PM    Reason for Call: Phone Call    Description: Patient called to schedule a lab appointment stating Dr. Gonzalez wanted him to get some blood work in in a couple weeks. There are currently no active orders.     Was an appointment offered for this call? Yes  If yes : Appointment type              Date    Preferred method for responding to this message: Telephone Call  What is your phone number ?    If we cannot reach you directly, may we leave a detailed response at the number you provided? Yes    Can this message wait until your PCP/provider returns, if available today? YES    Amna Henry

## 2024-08-19 NOTE — TELEPHONE ENCOUNTER
Started on losartan 7/22/24.    Patient inquiring on lab only appointment, no orders pended for future, please advise.

## 2024-08-20 DIAGNOSIS — I10 PRIMARY HYPERTENSION: Primary | ICD-10-CM

## 2024-08-20 NOTE — TELEPHONE ENCOUNTER
Call returned to patient, update provided lab orders have been placed by Dr. Gonzalez.     Lab only appointment scheduled at  Lab on 8/21/24 at 815 am.     Patient verbalized understanding.

## 2024-08-21 ENCOUNTER — LAB (OUTPATIENT)
Dept: LAB | Facility: OTHER | Age: 49
End: 2024-08-21
Payer: COMMERCIAL

## 2024-08-21 DIAGNOSIS — I10 PRIMARY HYPERTENSION: ICD-10-CM

## 2024-08-21 LAB
ANION GAP SERPL CALCULATED.3IONS-SCNC: 14 MMOL/L (ref 7–15)
BUN SERPL-MCNC: 23.2 MG/DL (ref 6–20)
CALCIUM SERPL-MCNC: 9.3 MG/DL (ref 8.8–10.4)
CHLORIDE SERPL-SCNC: 102 MMOL/L (ref 98–107)
CREAT SERPL-MCNC: 1.32 MG/DL (ref 0.67–1.17)
EGFRCR SERPLBLD CKD-EPI 2021: 67 ML/MIN/1.73M2
GLUCOSE SERPL-MCNC: 139 MG/DL (ref 70–99)
HCO3 SERPL-SCNC: 22 MMOL/L (ref 22–29)
POTASSIUM SERPL-SCNC: 4.1 MMOL/L (ref 3.4–5.3)
SODIUM SERPL-SCNC: 138 MMOL/L (ref 135–145)

## 2024-08-21 PROCEDURE — 36415 COLL VENOUS BLD VENIPUNCTURE: CPT

## 2024-08-21 PROCEDURE — 80048 BASIC METABOLIC PNL TOTAL CA: CPT

## 2024-09-23 ENCOUNTER — TELEPHONE (OUTPATIENT)
Dept: INTERNAL MEDICINE | Facility: OTHER | Age: 49
End: 2024-09-23

## 2024-09-23 DIAGNOSIS — I10 PRIMARY HYPERTENSION: ICD-10-CM

## 2024-09-23 RX ORDER — LOSARTAN POTASSIUM 25 MG/1
25 TABLET ORAL DAILY
Qty: 15 TABLET | Refills: 0 | Status: SHIPPED | OUTPATIENT
Start: 2024-09-23

## 2024-09-23 RX ORDER — LOSARTAN POTASSIUM 25 MG/1
25 TABLET ORAL DAILY
Qty: 90 TABLET | Refills: 2 | Status: SHIPPED | OUTPATIENT
Start: 2024-09-23

## 2024-09-23 NOTE — TELEPHONE ENCOUNTER
Order pended for 90 day supply to [facility] per patient request.     losartan (COZAAR) 25 MG tablet         Last Written Prescription Date:  7/22/24  Last Fill Quantity: 30,   # refills: 1  Last Office Visit: 7/22/24  Future Office visit:       Routing refill request to provider for review/approval because:    BP Readings from Last 3 Encounters:   07/22/24 (!) 168/110   02/26/24 (!) 158/96   01/04/24 134/88

## 2024-09-23 NOTE — TELEPHONE ENCOUNTER
Reason for call:  Medication      Have you contacted your pharmacy? Yes COMPLETELY OUT OF MEDICATION - NO REFILLS AT [facility] - TOOK LAST PILL 9-22-24  Medication: LOSARTAN 25 MG  What Pharmacy do you use? [facility] [place] [facility]      (Please note that the turn-around-time for prescriptions is 72 business hours; I am sending your request at this time. SEND TO appropriate Care Team Pool )

## 2024-09-23 NOTE — TELEPHONE ENCOUNTER
Order pended for losartan #15 tablets until receives full prescription from [employer] [facility] [employer]     losartan (COZAAR) 25 MG tablet         Last Written Prescription Date:  7/22/24  Last Fill Quantity: 30,   # refills: 1  Last Office Visit: 7/22/24  Future Office visit:       Routing refill request to provider for review/approval because:    BP Readings from Last 3 Encounters:   07/22/24 (!) 168/110   02/26/24 (!) 158/96   01/04/24 134/88

## 2024-11-24 ENCOUNTER — HEALTH MAINTENANCE LETTER (OUTPATIENT)
Age: 49
End: 2024-11-24

## 2025-02-24 PROBLEM — M54.40 ACUTE BACK PAIN WITH SCIATICA: Status: ACTIVE | Noted: 2018-01-01

## 2025-04-13 DIAGNOSIS — I10 PRIMARY HYPERTENSION: ICD-10-CM

## 2025-04-14 NOTE — TELEPHONE ENCOUNTER
amLODIPine (NORVASC) 10 MG tablet 90 tablet 2 7/18/2024   Last Office Visit: 7/22/24     Future Office visit:       Routing refill request to provider for review/approval because:

## 2025-04-15 RX ORDER — AMLODIPINE BESYLATE 10 MG/1
TABLET ORAL
Qty: 90 TABLET | Refills: 0 | Status: SHIPPED | OUTPATIENT
Start: 2025-04-15

## 2025-04-15 NOTE — TELEPHONE ENCOUNTER
Routing refill request to provider for review/approval because:  Calcium Channel Blockers Protocol  [id number]/13/2025 11:31 PM   Protocol Details   Most recent blood pressure under 140/90 in past 12 months     BP Readings from Last 3 Encounters:   07/22/24 (!) 168/110   02/26/24 (!) 158/96   01/04/24 134/88     Est care with Dr. Manuel appointment 7/1/25

## 2025-04-17 ENCOUNTER — TELEPHONE (OUTPATIENT)
Dept: FAMILY MEDICINE | Facility: OTHER | Age: 50
End: 2025-04-17

## 2025-04-17 NOTE — TELEPHONE ENCOUNTER
8:51 AM    Reason for Call: OVERBOOK    Patient is having the following symptoms: joint pain for several weeks. Patient has establish care appointment in July, your next available is June 23.  He was a [facility] patient  Would you be ok to schedule him in a Dr. Only spot due to increased joint pain?     The patient is requesting an appointment for office with Mar .    Was an appointment offered for this call? No  If yes : Appointment type              Date    Preferred method for responding to this message: Telephone Call  What is your phone number [phone]    If we cannot reach you directly, may we leave a detailed response at the number you provided? Yes

## 2025-06-01 DIAGNOSIS — I10 PRIMARY HYPERTENSION: ICD-10-CM

## 2025-06-02 RX ORDER — LOSARTAN POTASSIUM 25 MG/1
25 TABLET ORAL DAILY
Qty: 90 TABLET | Refills: 0 | Status: SHIPPED | OUTPATIENT
Start: 2025-06-02

## 2025-06-02 NOTE — TELEPHONE ENCOUNTER
LOSARTAN TABS 25MG       Last Written Prescription Date:  9/23/24  Last Fill Quantity: 90,   # refills: 2  Last Office Visit: 7/22/24  Future Office visit:       Routing refill request to provider for review/approval because:    Angiotensin-II Receptors Dljely8306/01/2025 11:15 PM   Protocol Details Most recent blood pressure under 140/90 in past 12 months        BP Readings from Last 3 Encounters:   07/22/24 (!) 168/110   02/26/24 (!) 158/96   01/04/24 134/88       Future Appointments 6/2/2025 - 11/29/2025        Date Visit Type Length Department Provider     7/1/2025  9:00 AM NEW - OFFICE VISIT 30 min [facility] Quinton Manuel MD    Location Instructions:     [facility] is located at the East Entrance of the [facility] and clinic building,door number [phone].Check in at upper registration across from the retail pharmacy.

## 2025-07-13 DIAGNOSIS — I10 PRIMARY HYPERTENSION: ICD-10-CM

## 2025-07-14 NOTE — TELEPHONE ENCOUNTER
Amlodipine      Last Written Prescription Date:  4/15/25  Last Fill Quantity: 90,   # refills: 0  Last Office Visit: 7/22/24  Future Office visit:       Routing refill request to provider for review/approval because:

## 2025-07-15 RX ORDER — AMLODIPINE BESYLATE 10 MG/1
10 TABLET ORAL DAILY
Qty: 14 TABLET | Refills: 0 | OUTPATIENT
Start: 2025-07-15

## 2025-07-15 NOTE — TELEPHONE ENCOUNTER
I called patient and he stated that the amlodipine was cancelled and that he is only on losartan now. He stated to deny any future amlodipine prescription requests.

## 2025-07-24 ENCOUNTER — OFFICE VISIT (OUTPATIENT)
Dept: FAMILY MEDICINE | Facility: OTHER | Age: 50
End: 2025-07-24
Attending: FAMILY MEDICINE
Payer: COMMERCIAL

## 2025-07-24 VITALS
BODY MASS INDEX: 45.84 KG/M2 | WEIGHT: 315 LBS | OXYGEN SATURATION: 95 % | SYSTOLIC BLOOD PRESSURE: 124 MMHG | RESPIRATION RATE: 18 BRPM | HEART RATE: 65 BPM | TEMPERATURE: 97.8 F | DIASTOLIC BLOOD PRESSURE: 72 MMHG

## 2025-07-24 DIAGNOSIS — G47.33 OSA (OBSTRUCTIVE SLEEP APNEA): ICD-10-CM

## 2025-07-24 DIAGNOSIS — Z11.59 NEED FOR HEPATITIS C SCREENING TEST: ICD-10-CM

## 2025-07-24 DIAGNOSIS — Z13.220 LIPID SCREENING: ICD-10-CM
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
## 2025-07-24 DIAGNOSIS — Z11.4 SCREENING FOR HIV (HUMAN IMMUNODEFICIENCY VIRUS): ICD-10-CM

## 2025-07-24 DIAGNOSIS — R14.2 BELCHING: ICD-10-CM

## 2025-07-24 DIAGNOSIS — R10.9 ABDOMINAL DISCOMFORT: Primary | ICD-10-CM

## 2025-07-24 DIAGNOSIS — M79.10 MYALGIA: ICD-10-CM

## 2025-07-24 DIAGNOSIS — R53.83 OTHER FATIGUE: ICD-10-CM

## 2025-07-24 DIAGNOSIS — Z12.5 SCREENING PSA (PROSTATE SPECIFIC ANTIGEN): ICD-10-CM

## 2025-07-24 DIAGNOSIS — M1A.9XX0 CHRONIC GOUT INVOLVING TOE WITHOUT TOPHUS, UNSPECIFIED CAUSE, UNSPECIFIED LATERALITY: ICD-10-CM

## 2025-07-24 DIAGNOSIS — M25.50 ARTHRALGIA, UNSPECIFIED JOINT: ICD-10-CM

## 2025-07-24 DIAGNOSIS — R10.9 ABDOMINAL DISCOMFORT: ICD-10-CM

## 2025-07-24 DIAGNOSIS — E66.01 MORBID OBESITY (H): ICD-10-CM
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
## 2025-07-24 RX ORDER — IBUPROFEN 800 MG/1
800 TABLET, FILM COATED ORAL 3 TIMES DAILY PRN
COMMUNITY
Start: 2024-08-29 | End: 2025-07-24

## 2025-07-24 ASSESSMENT — PAIN SCALES - GENERAL: PAINLEVEL_OUTOF10: NO PAIN (0)

## 2025-07-24 NOTE — PROGRESS NOTES
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    Severe RUSSELL - cpap and bipap stop his breathing and made worse so has  mouthguard  Says ahi went from  down to 30 with mouthguard  Can't sleep without the mought guard            Pain History:  When did you first notice your pain? 2 years   Have you seen this provider for your pain in the past? No   Where in your body do your have pain? Joints and muscles  Are you seeing anyone else for your pain? No  What makes your pain better? Umary (too expensive to get)  What makes your pain worse? Unsure as to cause  How has pain affected your ability to work? Struggles but is still able to work  Who lives in your household? Self and wife    {Rooming staff  Please complete PHQ assessment :180866}  {Rooming staff  Please complete GAD7 assessment :739050}  {Rooming staff  Please complete the PEG Assessment  Assess Pain, Function, and Quality of Life. Complete every pain visit :455121}        7/24/2025     4:45 PM   PEG Score   PEG Total Score 8       Chronic Pain - Initial Assessment:    How would you describe your pain? ***  Have you had any recent changes to the severity or character of your pain? ***  Is there an underlying cause that has been identified? ***  Has your ability to work or do daily activities changed recently because of your pain? ***  Which of these pain treatments have you tried? { :23081}  Previous medication  treatments:  { :037156}  {If newly prescribing or considering opioid therapy, the Opioid Risk Tool must be completed :136105}  {Pull in ORT results (Optional):096227}  {RIOSORD needs to be completed annually :592420}  {Pull in RIOSORD results (Optional):832092}    {Provider  Link to Assessments  Link to Pain Management SmartSet  Includes non-opioid pharmacological medications and referrals  :467118}  {additonal problems for provider to add (Optional):298974}    {ROS Picklists (Optional):476923}      Objective    /72 (BP Location: Left arm, Patient Position: Sitting, Cuff Size: Adult Large)   Pulse 65   Temp 97.8  F (36.6  C) (Tympanic)   Resp 18   Wt (!) 161.9 kg (357 lb)   SpO2 95%   BMI 45.84 kg/m    Body mass index is 45.84 kg/m .  Physical Exam   {Exam List (Optional):715858}    {Diagnostic Test Results (Optional):463743}        Signed Electronically by: Jayme Mena DO  {Email feedback regarding this note to [email or web address]   :495177}   and atraumatic.      Mouth/Throat:      Mouth: Mucous membranes are moist.      Pharynx: Oropharynx is clear.   Eyes:      Conjunctiva/sclera: Conjunctivae normal.      Pupils: Pupils are equal, round, and reactive to light.   Neck:      Vascular: No carotid bruit.   Cardiovascular:      Rate and Rhythm: Normal rate and regular rhythm.      Heart sounds: Normal heart sounds. No murmur heard.  Pulmonary:      Effort: Pulmonary effort is normal.      Breath sounds: Normal breath sounds.   Abdominal:      General: Bowel sounds are normal. There is no distension.      Palpations: Abdomen is soft.      Tenderness: There is no abdominal tenderness. There is no guarding.   Musculoskeletal:      Right lower leg: No edema.      Left lower leg: No edema.   Lymphadenopathy:      Cervical: No cervical adenopathy.   Skin:     Coloration: Skin is not jaundiced.   Neurological:      Mental Status: He is alert and oriented to person, place, and time.                    Signed Electronically by: Jayme Mena DO

## 2025-07-30 ENCOUNTER — LAB (OUTPATIENT)
Dept: LAB | Facility: OTHER | Age: 50
End: 2025-07-30
Payer: COMMERCIAL

## 2025-07-30 DIAGNOSIS — M79.10 MYALGIA: ICD-10-CM
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
## 2025-07-30 DIAGNOSIS — Z12.5 SCREENING PSA (PROSTATE SPECIFIC ANTIGEN): ICD-10-CM

## 2025-07-30 DIAGNOSIS — Z13.220 LIPID SCREENING: ICD-10-CM

## 2025-07-30 DIAGNOSIS — E66.01 MORBID OBESITY (H): ICD-10-CM

## 2025-07-30 DIAGNOSIS — M1A.9XX0 CHRONIC GOUT INVOLVING TOE WITHOUT TOPHUS, UNSPECIFIED CAUSE, UNSPECIFIED LATERALITY: ICD-10-CM

## 2025-07-30 DIAGNOSIS — Z11.4 SCREENING FOR HIV (HUMAN IMMUNODEFICIENCY VIRUS): ICD-10-CM

## 2025-07-30 DIAGNOSIS — M25.50 ARTHRALGIA, UNSPECIFIED JOINT: ICD-10-CM

## 2025-07-30 DIAGNOSIS — R53.83 OTHER FATIGUE: ICD-10-CM

## 2025-07-30 DIAGNOSIS — Z11.59 NEED FOR HEPATITIS C SCREENING TEST: ICD-10-CM

## 2025-07-30 DIAGNOSIS — R10.9 ABDOMINAL DISCOMFORT: ICD-10-CM

## 2025-07-30 LAB
ALBUMIN SERPL BCG-MCNC: 4.1 G/DL (ref 3.5–5.2)
ALBUMIN UR-MCNC: 100 MG/DL
ALP SERPL-CCNC: 70 U/L (ref 40–150)
ALT SERPL W P-5'-P-CCNC: 99 U/L (ref 0–70)
ANION GAP SERPL CALCULATED.3IONS-SCNC: 10 MMOL/L (ref 7–15)
APPEARANCE UR: CLEAR
AST SERPL W P-5'-P-CCNC: 64 U/L (ref 0–45)
B BURGDOR IGG+IGM SER QL: 0.11
BACTERIA #/AREA URNS HPF: ABNORMAL /HPF
BASOPHILS # BLD AUTO: 0 10E3/UL (ref 0–0.2)
BASOPHILS NFR BLD AUTO: 0 %
BILIRUB SERPL-MCNC: 0.6 MG/DL
BILIRUB UR QL STRIP: NEGATIVE
BUN SERPL-MCNC: 28.7 MG/DL (ref 6–20)
CALCIUM SERPL-MCNC: 9.3 MG/DL (ref 8.8–10.4)
CHLORIDE SERPL-SCNC: 106 MMOL/L (ref 98–107)
CHOLEST SERPL-MCNC: 120 MG/DL
CK SERPL-CCNC: 140 U/L (ref 39–308)
COLOR UR AUTO: YELLOW
CREAT SERPL-MCNC: 1.18 MG/DL (ref 0.67–1.17)
EGFRCR SERPLBLD CKD-EPI 2021: 76 ML/MIN/1.73M2
EOSINOPHIL # BLD AUTO: 0.4 10E3/UL (ref 0–0.7)
EOSINOPHIL NFR BLD AUTO: 5 %
ERYTHROCYTE [DISTWIDTH] IN BLOOD BY AUTOMATED COUNT: 13 % (ref 10–15)
ERYTHROCYTE [SEDIMENTATION RATE] IN BLOOD BY WESTERGREN METHOD: 7 MM/HR (ref 0–15)
FASTING STATUS PATIENT QL REPORTED: NO
FASTING STATUS PATIENT QL REPORTED: NO
GLUCOSE SERPL-MCNC: 119 MG/DL (ref 70–99)
GLUCOSE UR STRIP-MCNC: NEGATIVE MG/DL
HCO3 SERPL-SCNC: 22 MMOL/L (ref 22–29)
HCT VFR BLD AUTO: 46.3 % (ref 40–53)
HDLC SERPL-MCNC: 29 MG/DL
HGB BLD-MCNC: 16.8 G/DL (ref 13.3–17.7)
HGB UR QL STRIP: NEGATIVE
IMM GRANULOCYTES # BLD: 0 10E3/UL
IMM GRANULOCYTES NFR BLD: 0 %
KETONES UR STRIP-MCNC: NEGATIVE MG/DL
LDLC SERPL CALC-MCNC: 67 MG/DL
LEUKOCYTE ESTERASE UR QL STRIP: NEGATIVE
LIPASE SERPL-CCNC: 27 U/L (ref 13–60)
LYMPHOCYTES # BLD AUTO: 2.6 10E3/UL (ref 0.8–5.3)
LYMPHOCYTES NFR BLD AUTO: 35 %
MAGNESIUM SERPL-MCNC: 1.9 MG/DL (ref 1.7–2.3)
MCH RBC QN AUTO: 29.4 PG (ref 26.5–33)
MCHC RBC AUTO-ENTMCNC: 36.3 G/DL (ref 31.5–36.5)
MCV RBC AUTO: 81 FL (ref 78–100)
MONOCYTES # BLD AUTO: 0.5 10E3/UL (ref 0–1.3)
MONOCYTES NFR BLD AUTO: 7 %
NEUTROPHILS # BLD AUTO: 3.9 10E3/UL (ref 1.6–8.3)
NEUTROPHILS NFR BLD AUTO: 53 %
NITRATE UR QL: NEGATIVE
NONHDLC SERPL-MCNC: 91 MG/DL
PH UR STRIP: 5.5 [PH] (ref 5–7)
PLATELET # BLD AUTO: 202 10E3/UL (ref 150–450)
POTASSIUM SERPL-SCNC: 4.4 MMOL/L (ref 3.4–5.3)
PROT SERPL-MCNC: 7.4 G/DL (ref 6.4–8.3)
PSA SERPL DL<=0.01 NG/ML-MCNC: 0.36 NG/ML (ref 0–2.5)
RBC # BLD AUTO: 5.71 10E6/UL (ref 4.4–5.9)
RBC #/AREA URNS AUTO: ABNORMAL /HPF
SODIUM SERPL-SCNC: 138 MMOL/L (ref 135–145)
SP GR UR STRIP: 1.02 (ref 1–1.03)
SQUAMOUS #/AREA URNS AUTO: ABNORMAL /LPF
TRIGL SERPL-MCNC: 121 MG/DL
TSH SERPL DL<=0.005 MIU/L-ACNC: 1.88 UIU/ML (ref 0.3–4.2)
URATE SERPL-MCNC: 7.6 MG/DL (ref 3.4–7)
UROBILINOGEN UR STRIP-ACNC: 0.2 E.U./DL
WBC # BLD AUTO: 7.4 10E3/UL (ref 4–11)
WBC #/AREA URNS AUTO: ABNORMAL /HPF

## 2025-07-30 PROCEDURE — 83735 ASSAY OF MAGNESIUM: CPT

## 2025-07-30 PROCEDURE — 86618 LYME DISEASE ANTIBODY: CPT

## 2025-07-30 PROCEDURE — 85652 RBC SED RATE AUTOMATED: CPT

## 2025-07-30 PROCEDURE — 80061 LIPID PANEL: CPT

## 2025-07-30 PROCEDURE — 86364 TISS TRNSGLTMNASE EA IG CLAS: CPT

## 2025-07-30 PROCEDURE — 82306 VITAMIN D 25 HYDROXY: CPT

## 2025-07-30 PROCEDURE — 83690 ASSAY OF LIPASE: CPT

## 2025-07-30 PROCEDURE — 86038 ANTINUCLEAR ANTIBODIES: CPT

## 2025-07-30 PROCEDURE — 84403 ASSAY OF TOTAL TESTOSTERONE: CPT

## 2025-07-30 PROCEDURE — 36415 COLL VENOUS BLD VENIPUNCTURE: CPT

## 2025-07-30 PROCEDURE — G0103 PSA SCREENING: HCPCS

## 2025-07-30 PROCEDURE — 81001 URINALYSIS AUTO W/SCOPE: CPT

## 2025-07-30 PROCEDURE — 84550 ASSAY OF BLOOD/URIC ACID: CPT

## 2025-07-30 PROCEDURE — 87798 DETECT AGENT NOS DNA AMP: CPT

## 2025-07-30 PROCEDURE — 86803 HEPATITIS C AB TEST: CPT

## 2025-07-30 PROCEDURE — 3048F LDL-C <100 MG/DL: CPT

## 2025-07-30 PROCEDURE — 82550 ASSAY OF CK (CPK): CPT

## 2025-07-30 PROCEDURE — 80050 GENERAL HEALTH PANEL: CPT

## 2025-07-30 PROCEDURE — 86431 RHEUMATOID FACTOR QUANT: CPT

## 2025-07-30 PROCEDURE — 87468 ANAPLSMA PHGCYTOPHLM AMP PRB: CPT

## 2025-07-30 PROCEDURE — 87389 HIV-1 AG W/HIV-1&-2 AB AG IA: CPT

## 2025-07-31 LAB
ANA SER QL IF: NEGATIVE
HCV AB SERPL QL IA: NONREACTIVE
HIV 1+2 AB+HIV1 P24 AG SERPL QL IA: NONREACTIVE
RHEUMATOID FACT SERPL-ACNC: <10 IU/ML
TTG IGA SER-ACNC: 0.9 U/ML
TTG IGG SER-ACNC: <0.6 U/ML
VIT D+METAB SERPL-MCNC: 32 NG/ML (ref 20–50)

## 2025-08-01 LAB
A PHAGOCYTOPH DNA BLD QL NAA+PROBE: NOT DETECTED
BABESIA DNA BLD QL NAA+PROBE: NOT DETECTED
EHRLICHIA DNA SPEC QL NAA+PROBE: NOT DETECTED
TESTOST SERPL-MCNC: 293 NG/DL (ref 240–950)

## 2025-08-27 ENCOUNTER — LAB (OUTPATIENT)
Dept: LAB | Facility: OTHER | Age: 50
End: 2025-08-27
Payer: COMMERCIAL

## 2025-08-31 DIAGNOSIS — I10 PRIMARY HYPERTENSION: ICD-10-CM

## 2025-09-02 RX ORDER — LOSARTAN POTASSIUM 25 MG/1
25 TABLET ORAL DAILY
Qty: 90 TABLET | Refills: 1 | Status: SHIPPED | OUTPATIENT
Start: 2025-09-02